# Patient Record
Sex: FEMALE | Race: OTHER | NOT HISPANIC OR LATINO | URBAN - METROPOLITAN AREA
[De-identification: names, ages, dates, MRNs, and addresses within clinical notes are randomized per-mention and may not be internally consistent; named-entity substitution may affect disease eponyms.]

---

## 2017-09-18 ENCOUNTER — EMERGENCY (EMERGENCY)
Facility: HOSPITAL | Age: 67
LOS: 1 days | Discharge: ANOTHER HOSPITAL | End: 2017-09-18
Attending: EMERGENCY MEDICINE | Admitting: EMERGENCY MEDICINE
Payer: MEDICARE

## 2017-09-18 VITALS
DIASTOLIC BLOOD PRESSURE: 81 MMHG | SYSTOLIC BLOOD PRESSURE: 162 MMHG | WEIGHT: 130.07 LBS | HEART RATE: 88 BPM | OXYGEN SATURATION: 98 % | TEMPERATURE: 99 F | RESPIRATION RATE: 11 BRPM

## 2017-09-18 DIAGNOSIS — Z79.4 LONG TERM (CURRENT) USE OF INSULIN: ICD-10-CM

## 2017-09-18 DIAGNOSIS — I24.9 ACUTE ISCHEMIC HEART DISEASE, UNSPECIFIED: ICD-10-CM

## 2017-09-18 DIAGNOSIS — E11.9 TYPE 2 DIABETES MELLITUS WITHOUT COMPLICATIONS: ICD-10-CM

## 2017-09-18 DIAGNOSIS — Z79.82 LONG TERM (CURRENT) USE OF ASPIRIN: ICD-10-CM

## 2017-09-18 DIAGNOSIS — I10 ESSENTIAL (PRIMARY) HYPERTENSION: ICD-10-CM

## 2017-09-18 LAB
ALBUMIN SERPL ELPH-MCNC: 3.5 G/DL — SIGNIFICANT CHANGE UP (ref 3.3–5)
ALP SERPL-CCNC: 71 U/L — SIGNIFICANT CHANGE UP (ref 40–120)
ALT FLD-CCNC: 16 U/L — SIGNIFICANT CHANGE UP (ref 12–78)
ANION GAP SERPL CALC-SCNC: 9 MMOL/L — SIGNIFICANT CHANGE UP (ref 5–17)
AST SERPL-CCNC: 18 U/L — SIGNIFICANT CHANGE UP (ref 15–37)
BASOPHILS # BLD AUTO: 0.1 K/UL — SIGNIFICANT CHANGE UP (ref 0–0.2)
BASOPHILS NFR BLD AUTO: 1.1 % — SIGNIFICANT CHANGE UP (ref 0–2)
BILIRUB SERPL-MCNC: 0.2 MG/DL — SIGNIFICANT CHANGE UP (ref 0.2–1.2)
BUN SERPL-MCNC: 17 MG/DL — SIGNIFICANT CHANGE UP (ref 7–23)
CALCIUM SERPL-MCNC: 8.6 MG/DL — SIGNIFICANT CHANGE UP (ref 8.5–10.1)
CHLORIDE SERPL-SCNC: 112 MMOL/L — HIGH (ref 96–108)
CK MB BLD-MCNC: 2.6 % — SIGNIFICANT CHANGE UP (ref 0–3.5)
CK MB CFR SERPL CALC: 1.9 NG/ML — SIGNIFICANT CHANGE UP (ref 0–3.6)
CK SERPL-CCNC: 73 U/L — SIGNIFICANT CHANGE UP (ref 26–192)
CO2 SERPL-SCNC: 20 MMOL/L — LOW (ref 22–31)
CREAT SERPL-MCNC: 1.3 MG/DL — SIGNIFICANT CHANGE UP (ref 0.5–1.3)
EOSINOPHIL # BLD AUTO: 0.2 K/UL — SIGNIFICANT CHANGE UP (ref 0–0.5)
EOSINOPHIL NFR BLD AUTO: 1.6 % — SIGNIFICANT CHANGE UP (ref 0–6)
GLUCOSE SERPL-MCNC: 117 MG/DL — HIGH (ref 70–99)
HCT VFR BLD CALC: 29.2 % — LOW (ref 34.5–45)
HGB BLD-MCNC: 9.6 G/DL — LOW (ref 11.5–15.5)
INR BLD: 1 RATIO — SIGNIFICANT CHANGE UP (ref 0.88–1.16)
LIDOCAIN IGE QN: 149 U/L — SIGNIFICANT CHANGE UP (ref 73–393)
LYMPHOCYTES # BLD AUTO: 2.6 K/UL — SIGNIFICANT CHANGE UP (ref 1–3.3)
LYMPHOCYTES # BLD AUTO: 23.8 % — SIGNIFICANT CHANGE UP (ref 13–44)
MCHC RBC-ENTMCNC: 27.2 PG — SIGNIFICANT CHANGE UP (ref 27–34)
MCHC RBC-ENTMCNC: 32.9 GM/DL — SIGNIFICANT CHANGE UP (ref 32–36)
MCV RBC AUTO: 82.6 FL — SIGNIFICANT CHANGE UP (ref 80–100)
MONOCYTES # BLD AUTO: 0.9 K/UL — SIGNIFICANT CHANGE UP (ref 0–0.9)
MONOCYTES NFR BLD AUTO: 7.8 % — SIGNIFICANT CHANGE UP (ref 1–9)
NEUTROPHILS # BLD AUTO: 7.2 K/UL — SIGNIFICANT CHANGE UP (ref 1.8–7.4)
NEUTROPHILS NFR BLD AUTO: 65.7 % — SIGNIFICANT CHANGE UP (ref 43–77)
PLATELET # BLD AUTO: 330 K/UL — SIGNIFICANT CHANGE UP (ref 150–400)
POTASSIUM SERPL-MCNC: 5.5 MMOL/L — HIGH (ref 3.5–5.3)
POTASSIUM SERPL-SCNC: 5.5 MMOL/L — HIGH (ref 3.5–5.3)
PROT SERPL-MCNC: 7.4 G/DL — SIGNIFICANT CHANGE UP (ref 6–8.3)
PROTHROM AB SERPL-ACNC: 10.9 SEC — SIGNIFICANT CHANGE UP (ref 9.8–12.7)
RBC # BLD: 3.54 M/UL — LOW (ref 3.8–5.2)
RBC # FLD: 14.7 % — HIGH (ref 10.3–14.5)
SODIUM SERPL-SCNC: 141 MMOL/L — SIGNIFICANT CHANGE UP (ref 135–145)
TROPONIN I SERPL-MCNC: 0.17 NG/ML — HIGH (ref 0.01–0.04)
WBC # BLD: 10.9 K/UL — HIGH (ref 3.8–10.5)
WBC # FLD AUTO: 10.9 K/UL — HIGH (ref 3.8–10.5)

## 2017-09-18 PROCEDURE — 71010: CPT | Mod: 26

## 2017-09-18 PROCEDURE — 99285 EMERGENCY DEPT VISIT HI MDM: CPT

## 2017-09-18 RX ORDER — ASPIRIN/CALCIUM CARB/MAGNESIUM 324 MG
81 TABLET ORAL DAILY
Qty: 0 | Refills: 0 | Status: DISCONTINUED | OUTPATIENT
Start: 2017-09-18 | End: 2017-09-19

## 2017-09-18 RX ORDER — SODIUM CHLORIDE 9 MG/ML
3 INJECTION INTRAMUSCULAR; INTRAVENOUS; SUBCUTANEOUS ONCE
Qty: 0 | Refills: 0 | Status: COMPLETED | OUTPATIENT
Start: 2017-09-18 | End: 2017-09-18

## 2017-09-18 RX ORDER — CLOPIDOGREL BISULFATE 75 MG/1
300 TABLET, FILM COATED ORAL ONCE
Qty: 0 | Refills: 0 | Status: COMPLETED | OUTPATIENT
Start: 2017-09-18 | End: 2017-09-18

## 2017-09-18 RX ORDER — ENOXAPARIN SODIUM 100 MG/ML
60 INJECTION SUBCUTANEOUS ONCE
Qty: 0 | Refills: 0 | Status: COMPLETED | OUTPATIENT
Start: 2017-09-18 | End: 2017-09-18

## 2017-09-18 RX ADMIN — Medication 81 MILLIGRAM(S): at 21:21

## 2017-09-18 RX ADMIN — SODIUM CHLORIDE 3 MILLILITER(S): 9 INJECTION INTRAMUSCULAR; INTRAVENOUS; SUBCUTANEOUS at 20:03

## 2017-09-18 RX ADMIN — CLOPIDOGREL BISULFATE 300 MILLIGRAM(S): 75 TABLET, FILM COATED ORAL at 21:44

## 2017-09-18 RX ADMIN — ENOXAPARIN SODIUM 60 MILLIGRAM(S): 100 INJECTION SUBCUTANEOUS at 21:43

## 2017-09-18 NOTE — ED PROVIDER NOTE - PROGRESS NOTE DETAILS
dw dr mccallum.  lovenox, plavix, hold pt in er for consult in am an likely cath pt seen by dr baer,  will be transferred to cath lab, accepting physician is dr patricia

## 2017-09-18 NOTE — ED PROVIDER NOTE - OBJECTIVE STATEMENT
Pt is a 66 yo femlae with diabetes. pt since 9/6 with intermittent chest pain radiating to throat on exertion and and relieved with rest.  no n/v, diaphoresis,  pt states currently no chest pain.  no leg swelling

## 2017-09-18 NOTE — ED ADULT NURSE NOTE - OBJECTIVE STATEMENT
pt presents to the ER awake alert and oriented x 4, NEWBY, airway patent C/O chest pain. Pt states she felt like something was sitting on top of her chest, denies SOB nausea or vomiting. VSS, EKG done, placed on the monitor, normal sinus. labs sent, awaiting results. will continue to monitor pt presents to the ER awake alert and oriented x 4, NEWBY, airway patent C/O chest pain. Pt states she felt like something was sitting on top of her chest, denies SOB nausea or vomiting. VSS, EKG done, placed on the monitor, normal sinus. labs sent, awaiting results. will continue to monitor  0700 patient received in bed A and O x3. No acute distress. Continuous cardiac monitoring in progress . MSpencer   0815 Gave report to Barbara in [place] No acute distress noted. .; MS  [id number] Patient transferred to [place] Cath Lab report given to EMS. No acute distress noted. Patient stable. MSpencer

## 2017-09-18 NOTE — ED PROVIDER NOTE - MEDICAL DECISION MAKING DETAILS
cp on exertion in diabetic patient since 9/6 cp on exertion in diabetic patient since 9/6--+ trop, lovenox, plavix, asa, cath in am likely

## 2017-09-18 NOTE — ED PROVIDER NOTE - CARE PLAN
Principal Discharge DX:	Chest pain on exertion Principal Discharge DX:	Chest pain on exertion  Secondary Diagnosis:	ACS (acute coronary syndrome)

## 2017-09-19 ENCOUNTER — OUTPATIENT (OUTPATIENT)
Dept: INPATIENT UNIT | Facility: HOSPITAL | Age: 67
LOS: 1 days | End: 2017-09-19
Payer: MEDICARE

## 2017-09-19 VITALS
TEMPERATURE: 98 F | HEART RATE: 84 BPM | RESPIRATION RATE: 16 BRPM | OXYGEN SATURATION: 97 % | WEIGHT: 130.07 LBS | DIASTOLIC BLOOD PRESSURE: 69 MMHG | SYSTOLIC BLOOD PRESSURE: 185 MMHG | HEIGHT: 59 IN

## 2017-09-19 VITALS
DIASTOLIC BLOOD PRESSURE: 77 MMHG | HEART RATE: 82 BPM | SYSTOLIC BLOOD PRESSURE: 150 MMHG | RESPIRATION RATE: 14 BRPM | OXYGEN SATURATION: 96 %

## 2017-09-19 DIAGNOSIS — Z98.890 OTHER SPECIFIED POSTPROCEDURAL STATES: Chronic | ICD-10-CM

## 2017-09-19 DIAGNOSIS — R07.89 OTHER CHEST PAIN: ICD-10-CM

## 2017-09-19 DIAGNOSIS — Z98.49 CATARACT EXTRACTION STATUS, UNSPECIFIED EYE: Chronic | ICD-10-CM

## 2017-09-19 DIAGNOSIS — Z98.891 HISTORY OF UTERINE SCAR FROM PREVIOUS SURGERY: Chronic | ICD-10-CM

## 2017-09-19 LAB
ANION GAP SERPL CALC-SCNC: 13 MMOL/L — SIGNIFICANT CHANGE UP (ref 5–17)
ANION GAP SERPL CALC-SCNC: 15 MMOL/L — SIGNIFICANT CHANGE UP (ref 5–17)
BUN SERPL-MCNC: 14 MG/DL — SIGNIFICANT CHANGE UP (ref 7–23)
BUN SERPL-MCNC: 18 MG/DL — SIGNIFICANT CHANGE UP (ref 7–23)
CALCIUM SERPL-MCNC: 10 MG/DL — SIGNIFICANT CHANGE UP (ref 8.4–10.5)
CALCIUM SERPL-MCNC: 9.2 MG/DL — SIGNIFICANT CHANGE UP (ref 8.4–10.5)
CHLORIDE SERPL-SCNC: 107 MMOL/L — SIGNIFICANT CHANGE UP (ref 96–108)
CHLORIDE SERPL-SCNC: 107 MMOL/L — SIGNIFICANT CHANGE UP (ref 96–108)
CHOLEST SERPL-MCNC: 233 MG/DL — HIGH (ref 10–199)
CK MB BLD-MCNC: 12.1 % — HIGH (ref 0–3.5)
CK MB CFR SERPL CALC: 16.2 NG/ML — HIGH (ref 0–3.8)
CK SERPL-CCNC: 134 U/L — SIGNIFICANT CHANGE UP (ref 25–170)
CO2 SERPL-SCNC: 20 MMOL/L — LOW (ref 22–31)
CO2 SERPL-SCNC: 22 MMOL/L — SIGNIFICANT CHANGE UP (ref 22–31)
CREAT SERPL-MCNC: 1.29 MG/DL — SIGNIFICANT CHANGE UP (ref 0.5–1.3)
CREAT SERPL-MCNC: 1.33 MG/DL — HIGH (ref 0.5–1.3)
GLUCOSE SERPL-MCNC: 114 MG/DL — HIGH (ref 70–99)
GLUCOSE SERPL-MCNC: 148 MG/DL — HIGH (ref 70–99)
HBA1C BLD-MCNC: 6.4 % — HIGH (ref 4–5.6)
HCT VFR BLD CALC: 30 % — LOW (ref 34.5–45)
HDLC SERPL-MCNC: 37 MG/DL — LOW (ref 40–125)
HGB BLD-MCNC: 10 G/DL — LOW (ref 11.5–15.5)
LIPID PNL WITH DIRECT LDL SERPL: 143 MG/DL — HIGH
MCHC RBC-ENTMCNC: 27.7 PG — SIGNIFICANT CHANGE UP (ref 27–34)
MCHC RBC-ENTMCNC: 33.2 GM/DL — SIGNIFICANT CHANGE UP (ref 32–36)
MCV RBC AUTO: 83.4 FL — SIGNIFICANT CHANGE UP (ref 80–100)
PLATELET # BLD AUTO: 313 K/UL — SIGNIFICANT CHANGE UP (ref 150–400)
POTASSIUM SERPL-MCNC: 4.6 MMOL/L — SIGNIFICANT CHANGE UP (ref 3.5–5.3)
POTASSIUM SERPL-MCNC: 5.8 MMOL/L — HIGH (ref 3.5–5.3)
POTASSIUM SERPL-SCNC: 4.6 MMOL/L — SIGNIFICANT CHANGE UP (ref 3.5–5.3)
POTASSIUM SERPL-SCNC: 5.8 MMOL/L — HIGH (ref 3.5–5.3)
RBC # BLD: 3.59 M/UL — LOW (ref 3.8–5.2)
RBC # FLD: 13.9 % — SIGNIFICANT CHANGE UP (ref 10.3–14.5)
SODIUM SERPL-SCNC: 142 MMOL/L — SIGNIFICANT CHANGE UP (ref 135–145)
SODIUM SERPL-SCNC: 142 MMOL/L — SIGNIFICANT CHANGE UP (ref 135–145)
TOTAL CHOLESTEROL/HDL RATIO MEASUREMENT: 6.3 RATIO — SIGNIFICANT CHANGE UP (ref 3.3–7.1)
TRIGL SERPL-MCNC: 263 MG/DL — HIGH (ref 10–149)
TROPONIN I SERPL-MCNC: 1.06 NG/ML — HIGH (ref 0.01–0.04)
TROPONIN T SERPL-MCNC: 0.47 NG/ML — HIGH (ref 0–0.06)
WBC # BLD: 9.3 K/UL — SIGNIFICANT CHANGE UP (ref 3.8–10.5)
WBC # FLD AUTO: 9.3 K/UL — SIGNIFICANT CHANGE UP (ref 3.8–10.5)

## 2017-09-19 PROCEDURE — 80053 COMPREHEN METABOLIC PANEL: CPT

## 2017-09-19 PROCEDURE — 93010 ELECTROCARDIOGRAM REPORT: CPT

## 2017-09-19 PROCEDURE — 36415 COLL VENOUS BLD VENIPUNCTURE: CPT

## 2017-09-19 PROCEDURE — 85610 PROTHROMBIN TIME: CPT

## 2017-09-19 PROCEDURE — 36000 PLACE NEEDLE IN VEIN: CPT

## 2017-09-19 PROCEDURE — 96372 THER/PROPH/DIAG INJ SC/IM: CPT

## 2017-09-19 PROCEDURE — 99285 EMERGENCY DEPT VISIT HI MDM: CPT | Mod: 25

## 2017-09-19 PROCEDURE — 82550 ASSAY OF CK (CPK): CPT

## 2017-09-19 PROCEDURE — 82553 CREATINE MB FRACTION: CPT

## 2017-09-19 PROCEDURE — 93005 ELECTROCARDIOGRAM TRACING: CPT

## 2017-09-19 PROCEDURE — 85027 COMPLETE CBC AUTOMATED: CPT

## 2017-09-19 PROCEDURE — 83690 ASSAY OF LIPASE: CPT

## 2017-09-19 PROCEDURE — 36416 COLLJ CAPILLARY BLOOD SPEC: CPT

## 2017-09-19 PROCEDURE — 84484 ASSAY OF TROPONIN QUANT: CPT

## 2017-09-19 PROCEDURE — 71045 X-RAY EXAM CHEST 1 VIEW: CPT

## 2017-09-19 RX ORDER — INSULIN LISPRO 100/ML
3 VIAL (ML) SUBCUTANEOUS
Qty: 0 | Refills: 0 | Status: DISCONTINUED | OUTPATIENT
Start: 2017-09-19 | End: 2017-09-20

## 2017-09-19 RX ORDER — ASPIRIN/CALCIUM CARB/MAGNESIUM 324 MG
81 TABLET ORAL DAILY
Qty: 0 | Refills: 0 | Status: DISCONTINUED | OUTPATIENT
Start: 2017-09-20 | End: 2017-09-20

## 2017-09-19 RX ORDER — DEXTROSE 50 % IN WATER 50 %
12.5 SYRINGE (ML) INTRAVENOUS ONCE
Qty: 0 | Refills: 0 | Status: DISCONTINUED | OUTPATIENT
Start: 2017-09-19 | End: 2017-09-20

## 2017-09-19 RX ORDER — ATORVASTATIN CALCIUM 80 MG/1
80 TABLET, FILM COATED ORAL AT BEDTIME
Qty: 0 | Refills: 0 | Status: DISCONTINUED | OUTPATIENT
Start: 2017-09-19 | End: 2017-09-20

## 2017-09-19 RX ORDER — AMLODIPINE BESYLATE 2.5 MG/1
5 TABLET ORAL DAILY
Qty: 0 | Refills: 0 | Status: DISCONTINUED | OUTPATIENT
Start: 2017-09-19 | End: 2017-09-19

## 2017-09-19 RX ORDER — AMLODIPINE BESYLATE 2.5 MG/1
10 TABLET ORAL DAILY
Qty: 0 | Refills: 0 | Status: DISCONTINUED | OUTPATIENT
Start: 2017-09-19 | End: 2017-09-20

## 2017-09-19 RX ORDER — SODIUM CHLORIDE 9 MG/ML
1000 INJECTION INTRAMUSCULAR; INTRAVENOUS; SUBCUTANEOUS
Qty: 0 | Refills: 0 | Status: DISCONTINUED | OUTPATIENT
Start: 2017-09-20 | End: 2017-09-20

## 2017-09-19 RX ORDER — DEXTROSE 50 % IN WATER 50 %
1 SYRINGE (ML) INTRAVENOUS ONCE
Qty: 0 | Refills: 0 | Status: DISCONTINUED | OUTPATIENT
Start: 2017-09-19 | End: 2017-09-20

## 2017-09-19 RX ORDER — DEXTROSE 50 % IN WATER 50 %
25 SYRINGE (ML) INTRAVENOUS ONCE
Qty: 0 | Refills: 0 | Status: DISCONTINUED | OUTPATIENT
Start: 2017-09-19 | End: 2017-09-20

## 2017-09-19 RX ORDER — INSULIN LISPRO 100/ML
VIAL (ML) SUBCUTANEOUS
Qty: 0 | Refills: 0 | Status: DISCONTINUED | OUTPATIENT
Start: 2017-09-19 | End: 2017-09-20

## 2017-09-19 RX ORDER — CLOPIDOGREL BISULFATE 75 MG/1
75 TABLET, FILM COATED ORAL DAILY
Qty: 0 | Refills: 0 | Status: DISCONTINUED | OUTPATIENT
Start: 2017-09-20 | End: 2017-09-20

## 2017-09-19 RX ORDER — GLUCAGON INJECTION, SOLUTION 0.5 MG/.1ML
1 INJECTION, SOLUTION SUBCUTANEOUS ONCE
Qty: 0 | Refills: 0 | Status: DISCONTINUED | OUTPATIENT
Start: 2017-09-19 | End: 2017-09-20

## 2017-09-19 RX ORDER — SODIUM CHLORIDE 9 MG/ML
1000 INJECTION, SOLUTION INTRAVENOUS
Qty: 0 | Refills: 0 | Status: DISCONTINUED | OUTPATIENT
Start: 2017-09-19 | End: 2017-09-20

## 2017-09-19 RX ORDER — INSULIN GLARGINE 100 [IU]/ML
12 INJECTION, SOLUTION SUBCUTANEOUS AT BEDTIME
Qty: 0 | Refills: 0 | Status: DISCONTINUED | OUTPATIENT
Start: 2017-09-19 | End: 2017-09-20

## 2017-09-19 RX ORDER — METOPROLOL TARTRATE 50 MG
25 TABLET ORAL
Qty: 0 | Refills: 0 | Status: DISCONTINUED | OUTPATIENT
Start: 2017-09-19 | End: 2017-09-20

## 2017-09-19 RX ORDER — SODIUM POLYSTYRENE SULFONATE 4.1 MEQ/G
30 POWDER, FOR SUSPENSION ORAL ONCE
Qty: 0 | Refills: 0 | Status: COMPLETED | OUTPATIENT
Start: 2017-09-19 | End: 2017-09-19

## 2017-09-19 RX ORDER — INSULIN LISPRO 100/ML
VIAL (ML) SUBCUTANEOUS AT BEDTIME
Qty: 0 | Refills: 0 | Status: DISCONTINUED | OUTPATIENT
Start: 2017-09-19 | End: 2017-09-20

## 2017-09-19 RX ADMIN — SODIUM POLYSTYRENE SULFONATE 30 GRAM(S): 4.1 POWDER, FOR SUSPENSION ORAL at 18:15

## 2017-09-19 RX ADMIN — INSULIN GLARGINE 12 UNIT(S): 100 INJECTION, SOLUTION SUBCUTANEOUS at 21:43

## 2017-09-19 RX ADMIN — ATORVASTATIN CALCIUM 80 MILLIGRAM(S): 80 TABLET, FILM COATED ORAL at 21:43

## 2017-09-19 RX ADMIN — Medication 25 MILLIGRAM(S): at 18:21

## 2017-09-19 RX ADMIN — Medication 3 UNIT(S): at 18:14

## 2017-09-19 RX ADMIN — Medication 1: at 18:15

## 2017-09-19 NOTE — CHART NOTE - NSCHARTNOTEFT_GEN_A_CORE
Patient is a 67y old  Female who presents with a chief complaint of chest s/p cardiac cath JACQUELINE x 2 OM1 via right radial. Called to the room by nurse for brief episode of lightheadedness possible due to vagal response to defecate after administration of Kayexalate for hyperkalemia 5.8.  Blood glucose 178mg/dl. Right radial assess site stable - no hematoma. Pt is now doing well with no further episodes. VSS. Will continue to monitor. Patient is a 67y old  Female who presents with a chief complaint of chest s/p cardiac cath JACQUELINE x 2 OM1 via right radial. Called to the room by nurse for brief episode of lightheadedness possible  vagal response from urge to defecate after administration of Kayexalate for hyperkalemia 5.8.  Blood glucose checked 178mg/dl. Right radial assess site stable - no hematoma. Pt is now doing well with no further episodes. VSS. Will continue to monitor.

## 2017-09-19 NOTE — H&P CARDIOLOGY - PMH
Diabetes mellitus    Hypertension CKD (chronic kidney disease) stage 3, GFR 30-59 ml/min    Diabetes mellitus    Hypertension

## 2017-09-19 NOTE — H&P CARDIOLOGY - FAMILY HISTORY
No pertinent family history in first degree relatives Father  Still living? Unknown  Family history of CABG, Age at diagnosis: Age Unknown     Mother  Still living? No  Family history of CABG, Age at diagnosis: Age Unknown

## 2017-09-19 NOTE — CHART NOTE - NSCHARTNOTEFT_GEN_A_CORE
CC: Hyperkalemia and CKD stage 3 s/p PCI for staged PCI 9/20 discussed plan with Dr. Moe, Nephrologist     HPI:  68 y/o with FH of CAD (Mom and DAD with CABG in their 70's) and pmh of HTN, DM2 (controlled, AIC 6.8), CKD stage 3,  presented to [place] ER on 9/18 at 6pm reporting chest pressure for about 15-20 minutes radiating from left chest to right at rest.  Pt also reports having dyspnea on exertion intermittently while climbing 1 flight of stairs for the past few months and throat discomfort over the past week that she treated with Nexium thinking it was heartburn.  Pt with Trop I 0.169->1.060; K5.5 ->5.8 Cr 1.30->1.29 EGFR 42.  Pt loaded with Plavix 300mg and ASA 81mg with Lovenox seen by Dr. Jennings, Cardiologist and transferred for cardiac cath for further evaluation 2/2 NSTEMI.  Pt denies symptoms presently.  Dr. Pena aware of K 5.8 will assess EDP to assess need for diuretic therapy to decrease potassium.  Will hold ARB for now pt with no arrhythmias on tele.    Renal - Dr. Bruno  Endocine - Dr. Susan Greco  PCP - Dr. Gary (19 Sep 2017 11:09)                          10.0   9.3   )-----------( 313      ( 19 Sep 2017 11:30 )             30.0   09-19    142  |  107  |  14  ----------------------------<  114<H>  5.8<H>   |  22  |  1.29    Ca    10.0      19 Sep 2017 11:30    TPro  7.4  /  Alb  3.5  /  TBili  0.2  /  DBili  x   /  AST  18  /  ALT  16  /  AlkPhos  71  09-18    Hemoglobin A1C, Whole Blood (09.19.17 @ 15:47)      Hemoglobin A1C, Whole Blood: 6.4: Method: Immunoassay         Reference Range                4.0-5.6%       High risk (prediabetic)        5.7-6.4%       Diabetic, diagnostic             >=6.5%       ADA diabetic treatment goal       <7.0%  The Hemoglobin A1c testing is NGSP-certified.6.4: Reference ranges are based  upon the 2010 recommendations of  the American Diabetes Association.  Interpretation may vary for children  and adolescents. %      Lipid Profile (09.19.17 @ 14:06)    HDL/Total Cholesterol Ratio Measurement: 6.3 RATIO    Cholesterol, Serum: 233 mg/dL    HDL Cholesterol, Serum: 37 mg/dL      Vital Signs T(C): 36.6 (09-19-17 @ 11:09), Max: 37.1 (09-18-17 @ 19:10)  HR: 83 (09-19-17 @ 17:00) (73 - 88)  BP: 156/69 (09-19-17 @ 17:00) (150/77 - 185/69)  RR: 17 (09-19-17 @ 17:00) (11 - 17)  SpO2: 99% (09-19-17 @ 17:00) (96% - 99%)  Wt(kg): --     Medications insulin glargine Injectable (LANTUS) 12 Unit(s) SubCutaneous at bedtime  insulin lispro Injectable (HumaLOG) 3 Unit(s) SubCutaneous three times a day before meals  insulin lispro (HumaLOG) corrective regimen sliding scale   SubCutaneous three times a day before meals  insulin lispro (HumaLOG) corrective regimen sliding scale   SubCutaneous at bedtime  dextrose 5%. 1000 milliLiter(s) IV Continuous <Continuous>  dextrose Gel 1 Dose(s) Oral once PRN  dextrose 50% Injectable 12.5 Gram(s) IV Push once  dextrose 50% Injectable 25 Gram(s) IV Push once  dextrose 50% Injectable 25 Gram(s) IV Push once  glucagon  Injectable 1 milliGRAM(s) IntraMuscular once PRN    < from: Cardiac Cath Lab - Adult (09.19.17 @ 14:14) >    CORONARY VESSELS: The coronary circulation is right dominant.  LM:   --  LM: Angiography showed minor luminal irregularities with no flow  limiting lesions.  LAD:   --  LAD: Angiography showed minor luminal irregularities with no  flow limiting lesions.  CX:   --  OM1: There was a 90 % stenosis.  RCA:   --  Mid RCA: There was a 80 % stenosis.  COMPLICATIONS: There were no complications.  DIAGNOSTIC RECOMMENDATIONS: ASA and Plavix for 1 year.    < end of copied text >      amLODIPine   Tablet 10 milliGRAM(s) Oral daily  sodium polystyrene sulfonate Suspension 30 Gram(s) Oral once  metoprolol 25 milliGRAM(s) Oral two times a day  atorvastatin 80 milliGRAM(s) Oral at bedtime        Plan:  68 y/o female with pmh of HTN, HLD, CKD3 with NSTEMI and hyperkalemia s/p PCI/JACQUELINE to OM1 90% x 2 for staged PCI to mRCA 80% 9/20/17  EDP 18    C/W Aspirin/Plavix         start Atorvastatin 80         start Metoprolol 25mg BID         Continue Lantus 12 (home dose 25) 2/2 cath         Humalog 3 units premeal         Increase Norvasc from 5mg to 10mg         Hold Valsartan         NS 0.9% @75 for 1 hour prior to cath and 4 hours post         Nephrology and Cardiology to see pt in am         Continue on tele and monitor as per protocol

## 2017-09-19 NOTE — CHART NOTE - NSCHARTNOTEFT_GEN_A_CORE
Called and spoke with Dr. Meo from [facility] (pts Nephrologist) to discuss treatment plan for pt's CKD and hyperkalemia.    HPI:  68 y/o with FH of CAD (Mom and DAD with CABG in their 70's) and pmh of HTN, DM2 (controlled, AIC 6.8), CKD stage 3,  presented to [place] ER on 9/18 at 6pm reporting chest pressure for about 15-20 minutes radiating from left chest to right at rest.  Pt also reports having dyspnea on exertion intermittently while climbing 1 flight of stairs for the past few months and throat discomfort over the past week that she treated with Nexium thinking it was heartburn.  Pt with Trop I 0.169->1.060; K5.5 ->5.8 Cr 1.30->1.29 EGFR 42.  Pt loaded with Plavix 300mg and ASA 81mg with Lovenox seen by Dr. Jennings, Cardiologist and transferred for cardiac cath for further evaluation 2/2 NSTEMI.  Pt denies symptoms presently.  Dr. Pena aware of K 5.8 will assess EDP to assess need for diuretic therapy to decrease potassium.  Will hold ARB for now pt with no arrhythmias on tele.    Renal - Dr. Moe  Endocine - Dr. Susan Greco  PCP - Dr. Gayr (19 Sep 2017 11:09)    Vital Signs T(C): 36.6 (09-19-17 @ 11:09), Max: 37.1 (09-18-17 @ 19:10)  HR: 83 (09-19-17 @ 17:00) (73 - 88)  BP: 156/69 (09-19-17 @ 17:00) (150/77 - 185/69)  RR: 17 (09-19-17 @ 17:00) (11 - 17)  SpO2: 99% (09-19-17 @ 17:00) (96% - 99%)  Wt(kg): --     Medications insulin glargine Injectable (LANTUS) 12 Unit(s) SubCutaneous at bedtime  insulin lispro Injectable (HumaLOG) 3 Unit(s) SubCutaneous three times a day before meals  insulin lispro (HumaLOG) corrective regimen sliding scale   SubCutaneous three times a day before meals  insulin lispro (HumaLOG) corrective regimen sliding scale   SubCutaneous at bedtime  dextrose 5%. 1000 milliLiter(s) IV Continuous <Continuous>  dextrose Gel 1 Dose(s) Oral once PRN  dextrose 50% Injectable 12.5 Gram(s) IV Push once  dextrose 50% Injectable 25 Gram(s) IV Push once  dextrose 50% Injectable 25 Gram(s) IV Push once  glucagon  Injectable 1 milliGRAM(s) IntraMuscular once PRN  amLODIPine   Tablet 10 milliGRAM(s) Oral daily  sodium polystyrene sulfonate Suspension 30 Gram(s) Oral once    < from: Cardiac Cath Lab - Adult (09.19.17 @ 14:14) >    CORONARY VESSELS: The coronary circulation is right dominant.  LM:   --  LM: Angiography showed minor luminal irregularities with no flow  limiting lesions.  LAD:   --  LAD: Angiography showed minor luminal irregularities with no  flow limiting lesions.  CX:   --  OM1: There was a 90 % stenosis.  RCA:   --  Mid RCA: There was a 80 % stenosis.  COMPLICATIONS: There were no complications.  DIAGNOSTIC RECOMMENDATIONS: ASA and Plavix for 1 year.  INTERVENTIONAL RECOMMENDATIONS: ASA and Plavix for 1 year.    < end of copied text >                        10.0   9.3   )-----------( 313      ( 19 Sep 2017 11:30 )             30.0     09-19    142  |  107  |  14  ----------------------------<  114<H>  5.8<H>   |  22  |  1.29    Ca    10.0      19 Sep 2017 11:30      TPro  7.4  /  Alb  3.5  /  TBili  0.2  /  DBili  x   /  AST  18  /  ALT  16  /  AlkPhos  71  09-18    Lipid Profile (09.19.17 @ 14:06)    HDL/Total Cholesterol Ratio Measurement: 6.3 RATIO    Cholesterol, Serum: 233 mg/dL    HDL Cholesterol, Serum: 37 mg/dL    Plan:  Kayexalate 30mg PO  repeat BMP in am or after BM  NS 0.9 % @75cc/hr 1 hour prior to cardiac cath for staged PCI on 9/20 and 4 hours post cath.  Hold Valsartan   Increase Amlodipine to 10mg   start Metoprolol 25mg bid 2/2 NSTEMI/CAD  start Lipitor 80mg  Continue on Tele

## 2017-09-19 NOTE — H&P CARDIOLOGY - ATTENDING COMMENTS
Patient seen and examined. Agree with above with following changes:   67 F w hx as listed p.w NSTEMI  - Now s/p PCI to OM  - Stage to RCA today  - Cont DAPT  - Cont Norvasc  - Increase lorpessor to 50mg q12  - Cont high dose statin  - Monitor and replete electrolytes. Keep K>4.0 and Mg>2.0.  - Further cardiac workup will depend on clinical course.   - If ok can d/c home later post PCI. Spent >30min

## 2017-09-19 NOTE — ED ADULT NURSE REASSESSMENT NOTE - NS ED NURSE REASSESS COMMENT FT1
pt awake alert and oriented x 4. NEWBY, airway patent. No SOB, denies chest pain, but C/O slight chest pressure. Vss, afebrile. will continue to monitor.

## 2017-09-19 NOTE — H&P CARDIOLOGY - PSH
No significant past surgical history History of colonoscopy    S/P carpal tunnel release    S/P  section  x 2  Status post cataract extraction and insertion of intraocular lens, unspecified laterality

## 2017-09-19 NOTE — CONSULT NOTE ADULT - SUBJECTIVE AND OBJECTIVE BOX
[facility] Consultation    CHIEF COMPLAINT: Patient is a 67y old  Female who presents with a chief complaint of chest pain    HPI: Andree developed chest pressure last night which she described as a severe central chest pressure radiating to both shoulders, different than symptoms of reflux she had been experiencing over the last week. The symptoms occurred at rest and were not associated with nausea, vomiting, diaphoresis, or palpitations. She continues to have pain, now 3/10 in severity after medical therapy.      PAST MEDICAL & SURGICAL HISTORY:  Hypertension  Diabetes mellitus  No significant past surgical history      SOCIAL HISTORY: no tob/etoh    FAMILY HISTORY: no CAD  No pertinent family history in first degree relatives      MEDICATIONS  (STANDING):  aspirin  chewable 81 milliGRAM(s) Oral fernando      Allergies    No Known Allergies      REVIEW OF SYSTEMS:    CONSTITUTIONAL: No weakness, fevers or chills  EYES: No visual changes, No diplopia  ENMT: pos throat pain , No exudate  NECK: No pain or stiffness  RESPIRATORY: No cough, wheezing, hemoptysis; No shortness of breath  CARDIOVASCULAR:  chest pain as above, no palpitations  GASTROINTESTINAL: No abdominal pain. No nausea, vomiting, or hematemesis; No diarrhea or constipation. No melena or hematochezia.  GENITOURINARY: No dysuria, frequency or hematuria  NEUROLOGICAL: No numbness or weakness  SKIN: No itching or rash  All other review of systems is negative unless indicated above    VITAL SIGNS:   Vital Signs Last 24 Hrs  T(C): 36.7 (19 Sep 2017 06:58), Max: 37.1 (18 Sep 2017 19:10)  T(F): 98.1 (19 Sep 2017 06:58), Max: 98.7 (18 Sep 2017 19:10)  HR: 82 (19 Sep 2017 06:58) (80 - 88)  BP: 157/74 (19 Sep 2017 06:58) (155/74 - 162/81)  BP(mean): --  RR: 12 (19 Sep 2017 06:58) (11 - 15)  SpO2: 97% (19 Sep 2017 06:58) (97% - 98%)        PHYSICAL EXAM    Constitutional: NAD, awake and alert, well-developed  Eyes:  EOMI,  Pupils round, no lesions  ENMT: no exudate or erythema  Pulmonary: Non-labored, breath sounds are clear bilaterally, No wheezing, rales or rhonchi  Cardiovascular: PMI not palpable  Regular S1 and S2, no murmurs, rubs, gallops or clicks  Gastrointestinal: Bowel Sounds present, soft, nontender.   Lymph: No peripheral edema. No cervical lymphadenopathy.  Neurological: Alert, no focal deficits  Skin: No rashes. Changes of chronic venous stasis. No cyanosis.  Psych:  Mood & affect appropriate    LABS: All Labs Reviewed:                        9.6    10.9  )-----------( 330      ( 18 Sep 2017 20:19 )             29.2     18 Sep 2017 20:19    141    |  112    |  17     ----------------------------<  117    5.5     |  20     |  1.30     Ca    8.6        18 Sep 2017 20:19    TPro  7.4    /  Alb  3.5    /  TBili  0.2    /  DBili  x      /  AST  18     /  ALT  16     /  AlkPhos  71     18 Sep 2017 20:19    PT/INR - ( 18 Sep 2017 20:19 )   PT: 10.9 sec;   INR: 1.00 ratio           CARDIAC MARKERS ( 19 Sep 2017 02:21 )  1.060 ng/mL / x     / x     / x     / x      CARDIAC MARKERS ( 18 Sep 2017 20:19 )  .169 ng/mL / x     / 73 U/L / x     / 1.9 ng/mL              RADIOLOGY/EKG:  Electrocardiography reveals sinus rhythm with normal electrocardiographic intervals and no significant abnormalities    < from: Xray Chest 1 View AP/PA (09.18.17 @ 20:20) >    EXAM:  CHEST 1 VIEW                            PROCEDURE DATE:  09/18/2017          INTERPRETATION:  History: Chest pain    Portable semierect view of the chest is performed. There are no prior   studies for comparison.  The cardiomediastinal silhouette is normal. there is no focal infiltrate   or pleural effusion. There is no pneumothorax. The osseous structures are   intact.    Impression: No active pulmonary disease.                PAUL CYR   This document has been electronically signed. Sep 18 2017  8:25PM                < end of copied text >

## 2017-09-19 NOTE — H&P CARDIOLOGY - HISTORY OF PRESENT ILLNESS
66 y/o with FH of CAD (Mom and DAD with CABG in their 70's) and pmh of HTN, DM2 (controlled, AIC 6.8), CKD stage 3,  presented to [place] ER on 9/18 at 6pm reporting chest pressure for about 15-20 minutes radiating from left chest to right at rest.  Pt also reports having dyspnea on exertion intermittently while climbing 1 flight of stairs for the past few months and throat discomfort over the past week that she treated with Nexium thinking it was heartburn.  Pt with Trop I 0.169->1.060; K5.5 ->5.8 Cr 1.30->1.29 EGFR 42.  Pt loaded with Plavix 300mg and ASA 81mg with Lovenox seen by Dr. Jennings, Cardiologist and transferred for cardiac cath for further evaluation 2/2 NSTEMI.  Pt denies symptoms presently.  Dr. Pena aware of K 5.8 will assess EDP to assess need for diuretic therapy to decrease potassium.  Will hold ARB for now pt with no arrhythmias on tele.    Renal - Dr. Bruno  Endocine - Dr. Susan Greco  PCP - Dr. Gary

## 2017-09-19 NOTE — CONSULT NOTE ADULT - ASSESSMENT
Andree's clinical presentation is consistent with an acute non-ST segment elevation myocardial infarction, still having chest discomfort. She has normal electrocardiography examination at present and remains hemodynamically stable. She is being treated with dual antiplatelet therapy including aspirin and clopidogrel. She has also been given enoxaparin. She'll be transferred for urgent cardiac catheterization to better define her coronary anatomy and need for further intervention. Arrangements have been made with [facility] and I explained her situation in depth.    Time spent 35 minutes of critical care medicine

## 2017-09-20 ENCOUNTER — TRANSCRIPTION ENCOUNTER (OUTPATIENT)
Age: 67
End: 2017-09-20

## 2017-09-20 VITALS
RESPIRATION RATE: 18 BRPM | DIASTOLIC BLOOD PRESSURE: 60 MMHG | SYSTOLIC BLOOD PRESSURE: 136 MMHG | TEMPERATURE: 98 F | HEART RATE: 86 BPM | OXYGEN SATURATION: 98 %

## 2017-09-20 LAB
ANION GAP SERPL CALC-SCNC: 16 MMOL/L — SIGNIFICANT CHANGE UP (ref 5–17)
BUN SERPL-MCNC: 19 MG/DL — SIGNIFICANT CHANGE UP (ref 7–23)
CALCIUM SERPL-MCNC: 9.1 MG/DL — SIGNIFICANT CHANGE UP (ref 8.4–10.5)
CHLORIDE SERPL-SCNC: 106 MMOL/L — SIGNIFICANT CHANGE UP (ref 96–108)
CO2 SERPL-SCNC: 20 MMOL/L — LOW (ref 22–31)
CREAT SERPL-MCNC: 1.36 MG/DL — HIGH (ref 0.5–1.3)
GLUCOSE SERPL-MCNC: 115 MG/DL — HIGH (ref 70–99)
HCT VFR BLD CALC: 27.9 % — LOW (ref 34.5–45)
HGB BLD-MCNC: 9.7 G/DL — LOW (ref 11.5–15.5)
MCHC RBC-ENTMCNC: 28.7 PG — SIGNIFICANT CHANGE UP (ref 27–34)
MCHC RBC-ENTMCNC: 34.7 GM/DL — SIGNIFICANT CHANGE UP (ref 32–36)
MCV RBC AUTO: 82.9 FL — SIGNIFICANT CHANGE UP (ref 80–100)
PLATELET # BLD AUTO: 323 K/UL — SIGNIFICANT CHANGE UP (ref 150–400)
POTASSIUM SERPL-MCNC: 4.5 MMOL/L — SIGNIFICANT CHANGE UP (ref 3.5–5.3)
POTASSIUM SERPL-SCNC: 4.5 MMOL/L — SIGNIFICANT CHANGE UP (ref 3.5–5.3)
RBC # BLD: 3.37 M/UL — LOW (ref 3.8–5.2)
RBC # FLD: 13.9 % — SIGNIFICANT CHANGE UP (ref 10.3–14.5)
SODIUM SERPL-SCNC: 142 MMOL/L — SIGNIFICANT CHANGE UP (ref 135–145)
WBC # BLD: 10.3 K/UL — SIGNIFICANT CHANGE UP (ref 3.8–10.5)
WBC # FLD AUTO: 10.3 K/UL — SIGNIFICANT CHANGE UP (ref 3.8–10.5)

## 2017-09-20 PROCEDURE — 99153 MOD SED SAME PHYS/QHP EA: CPT

## 2017-09-20 PROCEDURE — 83036 HEMOGLOBIN GLYCOSYLATED A1C: CPT

## 2017-09-20 PROCEDURE — C9600: CPT | Mod: RC

## 2017-09-20 PROCEDURE — 93458 L HRT ARTERY/VENTRICLE ANGIO: CPT | Mod: 59

## 2017-09-20 PROCEDURE — 93010 ELECTROCARDIOGRAM REPORT: CPT

## 2017-09-20 PROCEDURE — C1887: CPT

## 2017-09-20 PROCEDURE — 82553 CREATINE MB FRACTION: CPT

## 2017-09-20 PROCEDURE — C1725: CPT

## 2017-09-20 PROCEDURE — 80061 LIPID PANEL: CPT

## 2017-09-20 PROCEDURE — C1874: CPT

## 2017-09-20 PROCEDURE — 82550 ASSAY OF CK (CPK): CPT

## 2017-09-20 PROCEDURE — 99152 MOD SED SAME PHYS/QHP 5/>YRS: CPT

## 2017-09-20 PROCEDURE — C1769: CPT

## 2017-09-20 PROCEDURE — 85027 COMPLETE CBC AUTOMATED: CPT

## 2017-09-20 PROCEDURE — C9606: CPT | Mod: LC

## 2017-09-20 PROCEDURE — 80048 BASIC METABOLIC PNL TOTAL CA: CPT

## 2017-09-20 PROCEDURE — 93005 ELECTROCARDIOGRAM TRACING: CPT

## 2017-09-20 PROCEDURE — 84484 ASSAY OF TROPONIN QUANT: CPT

## 2017-09-20 PROCEDURE — 93010 ELECTROCARDIOGRAM REPORT: CPT | Mod: 77

## 2017-09-20 PROCEDURE — C1894: CPT

## 2017-09-20 RX ORDER — ATORVASTATIN CALCIUM 80 MG/1
1 TABLET, FILM COATED ORAL
Qty: 30 | Refills: 0 | OUTPATIENT
Start: 2017-09-20 | End: 2017-10-20

## 2017-09-20 RX ORDER — AMLODIPINE BESYLATE 2.5 MG/1
1 TABLET ORAL
Qty: 30 | Refills: 0 | OUTPATIENT
Start: 2017-09-20 | End: 2017-10-20

## 2017-09-20 RX ORDER — METOPROLOL TARTRATE 50 MG
50 TABLET ORAL
Qty: 0 | Refills: 0 | Status: DISCONTINUED | OUTPATIENT
Start: 2017-09-20 | End: 2017-09-20

## 2017-09-20 RX ORDER — METOPROLOL TARTRATE 50 MG
1 TABLET ORAL
Qty: 60 | Refills: 0 | OUTPATIENT
Start: 2017-09-20 | End: 2017-10-20

## 2017-09-20 RX ORDER — ASPIRIN/CALCIUM CARB/MAGNESIUM 324 MG
1 TABLET ORAL
Qty: 0 | Refills: 0 | COMMUNITY
Start: 2017-09-20

## 2017-09-20 RX ORDER — CLOPIDOGREL BISULFATE 75 MG/1
1 TABLET, FILM COATED ORAL
Qty: 90 | Refills: 3 | OUTPATIENT
Start: 2017-09-20 | End: 2018-09-14

## 2017-09-20 RX ADMIN — Medication 81 MILLIGRAM(S): at 05:48

## 2017-09-20 RX ADMIN — Medication 50 MILLIGRAM(S): at 17:31

## 2017-09-20 RX ADMIN — Medication 3 UNIT(S): at 07:30

## 2017-09-20 RX ADMIN — CLOPIDOGREL BISULFATE 75 MILLIGRAM(S): 75 TABLET, FILM COATED ORAL at 05:47

## 2017-09-20 RX ADMIN — AMLODIPINE BESYLATE 10 MILLIGRAM(S): 2.5 TABLET ORAL at 12:22

## 2017-09-20 RX ADMIN — Medication 1: at 17:32

## 2017-09-20 RX ADMIN — Medication 25 MILLIGRAM(S): at 05:48

## 2017-09-20 RX ADMIN — Medication 3 UNIT(S): at 17:31

## 2017-09-20 NOTE — DISCHARGE NOTE ADULT - PLAN OF CARE
Your blood pressure will be controlled. Continue with your blood pressure medications; eat a heart healthy diet with low salt diet; exercise regularly (consult with your physician or cardiologist first); maintain a heart healthy weight; if you smoke - quit (A resource to help you stop smoking is the [facility] – phone number [phone].); include healthy ways to manage stress. Continue to follow with your primary care physician or cardiologist. Your LDL cholesterol will be less than 70mg/dL Continue with your cholesterol medications. Eat a heart healthy diet that is low in saturated fats and salt, and includes whole grains, fruits, vegetables and lean protein; exercise regularly (consult with your physician or cardiologist first); maintain a heart healthy weight; if you smoke - quit (A resource to help you stop smoking is the [facility] – phone number [phone].). Continue to follow with your primary physician or cardiologist. Low salt, low fat diet.   Weight management.   Take medications as prescribed.    No smoking.  Follow up appointments with your doctor(s)  as instructed. No heavy lifting for 2 weeks, no strenuous activity  ( pushing/ pulling) no driving for x 2 days,  you may shower 24 hours following procedure but no bathing or swimming for x1  week, no strenuous sex for x 1 week & follow up with your cardiologist in 1-2 week Your hemoglobin A1C will be at a normal range (normal range is from 6-8) Continue to follow with your primary care MD or your endocrinologist. Discuss what the goal hemoglobin A1C level is for you.  Follow a heart healthy diabetic diet. If you check your fingerstick glucose at home, call your MD if it is greater than 250mg/dL on 2 occasions or less than 100mg/dL on 2 occasions. Know signs of low blood sugar, such as: dizziness, shakiness, sweating, confusion, hunger, nervousness- drink 4 ounces apple juice if occurs and call your doctor. Know early signs of high blood sugar, such as: frequent urination, increased thirst, blurry vision, fatigue, headache - call your doctor if this occurs.

## 2017-09-20 NOTE — PROGRESS NOTE ADULT - SUBJECTIVE AND OBJECTIVE BOX
Patient is a 67y Female  being evaluated for ckd  awaits cath today  resting comfortably  voiding well  pt known to dr munguia                        PHYSICAL EXAM:  Vitals:  T(F): 97.6 (09-20-17 @ 05:31), Max: 97.9 (09-19-17 @ 19:30)  HR: 88 (09-20-17 @ 05:31)  BP: 142/63 (09-20-17 @ 05:31)  BP(mean): 107 (09-19-17 @ 11:09)  RR: 17 (09-20-17 @ 05:31)  SpO2: 97% (09-20-17 @ 05:31)  Wt(kg): --  I and O's:    09-19 @ 07:01  -  09-20 @ 07:00  --------------------------------------------------------  IN: 1270 mL / OUT: 1000 mL / NET: 270 mL    09-20 @ 07:01  -  09-20 @ 08:31  --------------------------------------------------------  IN: 240 mL / OUT: 0 mL / NET: 240 mL        Height (cm): 149.86 (09-19 @ 12:18)  Weight (kg): 59 (09-19 @ 12:18), 59 (09-18 @ 19:10)  BMI (kg/m2): 26.3 (09-19 @ 12:18)    REVIEW OF SYSTEMS:  CONSTITUTIONAL: No weakness, fevers or chills  RESPIRATORY: No cough, wheezing, hemoptysis; No shortness of breath  CARDIOVASCULAR: No chest pain or palpitations  GI : no abd pains, nausea or vomiting  GENITOURINARY: No dysuria, frequency or hematuria  All other review of systems is negative unless indicated above.    Allergies    No Known Allergies    Intolerances        MEDICATIONS  (STANDING):  aspirin enteric coated 81 milliGRAM(s) Oral daily  insulin glargine Injectable (LANTUS) 12 Unit(s) SubCutaneous at bedtime  insulin lispro Injectable (HumaLOG) 3 Unit(s) SubCutaneous three times a day before meals  insulin lispro (HumaLOG) corrective regimen sliding scale   SubCutaneous three times a day before meals  insulin lispro (HumaLOG) corrective regimen sliding scale   SubCutaneous at bedtime  dextrose 5%. 1000 milliLiter(s) (50 mL/Hr) IV Continuous <Continuous>  dextrose 50% Injectable 12.5 Gram(s) IV Push once  dextrose 50% Injectable 25 Gram(s) IV Push once  dextrose 50% Injectable 25 Gram(s) IV Push once  clopidogrel Tablet 75 milliGRAM(s) Oral daily  amLODIPine   Tablet 10 milliGRAM(s) Oral daily  sodium chloride 0.9%. 1000 milliLiter(s) (75 mL/Hr) IV Continuous <Continuous>  atorvastatin 80 milliGRAM(s) Oral at bedtime  metoprolol 50 milliGRAM(s) Oral two times a day      LABS:    09-19 @ 07:01  -  09-20 @ 07:00  --------------------------------------------------------  IN: 1270 mL / OUT: 1000 mL / NET: 270 mL    09-20 @ 07:01  -  09-20 @ 08:31  --------------------------------------------------------  IN: 240 mL / OUT: 0 mL / NET: 240 mL      Height (cm): 149.86 (09-19 @ 12:18)  Weight (kg): 59 (09-19 @ 12:18), 59 (09-18 @ 19:10)  BMI (kg/m2): 26.3 (09-19 @ 12:18)      09-19 @ 07:01  -  09-20 @ 07:00  --------------------------------------------------------  IN: 1270 mL / OUT: 1000 mL / NET: 270 mL    09-20 @ 07:01  -  09-20 @ 08:31  --------------------------------------------------------  IN: 240 mL / OUT: 0 mL / NET: 240 mL      Height (cm): 149.86 (09-19 @ 12:18)  Weight (kg): 59 (09-19 @ 12:18), 59 (09-18 @ 19:10)  BMI (kg/m2): 26.3 (09-19 @ 12:18)    09-20    142  |  106  |  19  ----------------------------<  115<H>  4.5   |  20<L>  |  1.36<H>    Ca    9.1      20 Sep 2017 03:51    TPro  7.4  /  Alb  3.5  /  TBili  0.2  /  DBili  x   /  AST  18  /  ALT  16  /  AlkPhos  71  09-18              CBC Full  -  ( 20 Sep 2017 03:52 )  WBC Count : 10.3 K/uL  Hemoglobin : 9.7 g/dL  Hematocrit : 27.9 %  Platelet Count - Automated : 323 K/uL  Mean Cell Volume : 82.9 fl  Mean Cell Hemoglobin : 28.7 pg  Mean Cell Hemoglobin Concentration : 34.7 gm/dL  Auto Neutrophil # : x  Auto Lymphocyte # : x  Auto Monocyte # : x  Auto Eosinophil # : x  Auto Basophil # : x  Auto Neutrophil % : x  Auto Lymphocyte % : x  Auto Monocyte % : x  Auto Eosinophil % : x  Auto Basophil % : x    09-20    142  |  106  |  19  ----------------------------<  115<H>  4.5   |  20<L>  |  1.36<H>    Ca    9.1      20 Sep 2017 03:51    TPro  7.4  /  Alb  3.5  /  TBili  0.2  /  DBili  x   /  AST  18  /  ALT  16  /  AlkPhos  71  09-18        Imp:  67y Female  renal function stable  voiding well  await cath per cv  no objection to dc planning from renal standpoint with outpt followup labs, pending cath results/rx  as per cv  dw pt
Patient is a 67y old  Female who presents with a chief complaint of chest pain now s/p cardiac cath JACQUELINE 2 OM1 99%; mRCA (80%) via right radial  - staged  for         Allergies    No Known Allergies    Intolerances        Medications:  aspirin enteric coated 81 milliGRAM(s) Oral daily  insulin glargine Injectable (LANTUS) 12 Unit(s) SubCutaneous at bedtime  insulin lispro Injectable (HumaLOG) 3 Unit(s) SubCutaneous three times a day before meals  insulin lispro (HumaLOG) corrective regimen sliding scale   SubCutaneous three times a day before meals  insulin lispro (HumaLOG) corrective regimen sliding scale   SubCutaneous at bedtime  dextrose 5%. 1000 milliLiter(s) IV Continuous <Continuous>  dextrose Gel 1 Dose(s) Oral once PRN  dextrose 50% Injectable 12.5 Gram(s) IV Push once  dextrose 50% Injectable 25 Gram(s) IV Push once  dextrose 50% Injectable 25 Gram(s) IV Push once  glucagon  Injectable 1 milliGRAM(s) IntraMuscular once PRN  clopidogrel Tablet 75 milliGRAM(s) Oral daily  amLODIPine   Tablet 10 milliGRAM(s) Oral daily  sodium chloride 0.9%. 1000 milliLiter(s) IV Continuous <Continuous>  metoprolol 25 milliGRAM(s) Oral two times a day  atorvastatin 80 milliGRAM(s) Oral at bedtime      Vitals:  T(C): 36.6 (17 @ 19:30), Max: 36.7 (17 @ 06:58)  HR: 88 (17 @ 21:30) (73 - 89)  BP: 156/76 (17 @ 21:30) (102/62 - 185/69)  BP(mean): 107 (17 @ 11:09) (107 - 107)  RR: 17 (17 @ 21:30) (12 - 17)  SpO2: 97% (17 @ 21:30) (96% - 100%)  Wt(kg): --  Daily Height in cm: 149.86 (19 Sep 2017 11:09)    Daily Weight in k (19 Sep 2017 11:09)  I&O's Summary    19 Sep 2017 07:01  -  20 Sep 2017 05:08  --------------------------------------------------------  IN: 1090 mL / OUT: 1000 mL / NET: 90 mL          Physical Exam:  Appearance: Normal  HENT: Normal oral muscosa, NC/AT  Cardiovascular: S1S2, RRR, No M/R/G, no JVD, No Lower extremity edema  Procedural Access Site: Right radial.  No hematoma, Non-tender to palpation, 2+ pulse, No bruit, No Ecchymosis  Respiratory: Clear to auscultation bilaterally  Gastrointestinal: Soft, Non tender, Normal Bowel Sounds  Musculoskeletal: No clubbing, No joint deformity   Neurologic: Non-focal  Psychiatry: AAOx3, Mood & affect appropriate  Skin: No rashes, No ecchymoses, No cyanosis        142  |  106  |  19  ----------------------------<  115<H>  4.5   |  20<L>  |  1.36<H>    Ca    9.1      20 Sep 2017 03:51    TPro  7.4  /  Alb  3.5  /  TBili  0.2  /  DBili  x   /  AST  18  /  ALT  16  /  AlkPhos  71      PT/INR - ( 18 Sep 2017 20:19 )   PT: 10.9 sec;   INR: 1.00 ratio           CARDIAC MARKERS ( 19 Sep 2017 11:30 )  x     / 0.47 ng/mL / 134 U/L / x     / 16.2 ng/mL  CARDIAC MARKERS ( 19 Sep 2017 02:21 )  1.060 ng/mL / x     / x     / x     / x      CARDIAC MARKERS ( 18 Sep 2017 20:19 )  .169 ng/mL / x     / 73 U/L / x     / 1.9 ng/mL        Lipid panel Total Cholesterol: 233  LDL: 143  HDL: 37  T      Hgb A1c Hemoglobin A1C, Whole Blood: 6.4 % ( @ 15:47)      ECG: NSR 80bpm    Cath: < from: Cardiac Cath Lab - Adult (17 @ 14:14) >    [facility]  Department of Cardiology  [address] [place] [address]  [phone]  Cath Lab Report -- Comprehensive Report  Patient: YUNG AARON  Study date: 2017  Account number: [id number]  MR number: [id number]  : 1950  Gender: Female  Race: W  Case Physician(s):  NETTE Lal M.D.  Fellow:  Rubens Nunez M.D.  Referring Physician:  New Jennings M.D.  INDICATIONS: Initial NSTEMI.  HISTORY: The patient has a history of coronary artery disease. The patient  has hypertension, oral hypoglycemic-treated diabetes, and  medication-treated dyslipidemia.  PROCEDURE:  --  Left heart catheterization.  --  Left coronary angiography.  --  Right coronary angiography.  --  Intervention on OM1: drug-eluting stent.  TECHNIQUE: The risks and alternatives of the procedures and conscious  sedation were explained to the patient and informed consent was obtained.  Cardiac catheterization performed electively. Coronary intervention  performed electively.  Local anesthetic given. Right radial artery access. Left heart  catheterization. Left coronary artery angiography. The vessel was injected  utilizing a catheter. Right coronary artery angiography. The vessel was  injected utilizing a catheter. RADIATION EXPOSURE: 13.2 min. A successful  drug-eluting stent was performed on the 90 % lesion in the 1st obtuse  marginal. Following intervention there was a 1 % residual stenosis.  According to the ACC/AHA classification system, this lesion was a type C  lesion. There was JANES 3 flow before the procedure and JANES 3 flow after  the procedure. There was no dissection. Vessel setup was performed. A 6FR  JL3.5 LAUNCHER guiding catheter was used to intubate the vessel. Vessel  setup was performed. A [employer] UNIVERSAL 190CM wire was used to cross the  lesion. Balloon angioplasty was performed, using a 2.0 X 15 MAVERICK RX  balloon, with 1 inflations and a maximum inflation pressure of 14 blanquita. A  2.50 X 15 NAVEED drug-eluting stentwas placed across the lesion and  deployed at a maximum inflation pressure of 12 blanquiat. A 2.50 X 12 NAVEED  drug-eluting stent was placed across the lesion and deployed at a maximum  inflation pressure of 14 blanquita. Balloon angioplasty was performed, with 1  inflations and a maximum inflation pressure of 10 blanquita.  CONTRAST GIVEN: Omnipaque 37 ml. Omnipaque 69 ml.  MEDICATIONS GIVEN: Midazolam, 1 mg, IV. Fentanyl, 25 mcg, IV. Labetalol  (Trandate), 20 mg, IV. Verapamil (Isoptin, Calan, Covera), 2.5 mg, IA.  Heparin, 3000 units, IA. Heparin, 3000 units, IV. Clopidogrel (Plavix),  300 mg, PO. Aspirin, 81 mg, PO.  CORONARY VESSELS: The coronary circulation is right dominant.  LM:   --  LM: Angiography showed minor luminal irregularities with no flow  limiting lesions.  LAD:   --  LAD: Angiography showed minor luminal irregularities with no  flow limiting lesions.  CX:   --  OM1: There was a 90 % stenosis.  RCA:   --  Mid RCA: There was a 80 % stenosis.  COMPLICATIONS: There were no complications.  DIAGNOSTIC RECOMMENDATIONS: ASA and Plavix for 1 year.  INTERVENTIONAL RECOMMENDATIONS: ASA and Plavix for 1 year.  Prepared and signed by  Jose Raul Pena M.D.  Signed 2017 15:07:23  HEMODYNAMIC TABLES  Pressures:  Baseline  Pressures:  - HR: 81  Pressures:  - Rhythm:  Pressures:  -- Aortic Pressure (S/D/M): 171/82/122  Pressures:  -- Left Ventricle (s/edp): 154/16/--  Outputs:  Baseline  Outputs:  -- CALCULATIONS[age]  Outputs:  -- CALCULATIONS: Body Surface Area: 1.54  Outputs:  -- CALCULATIONS: Height in cm: 150.00  Outputs:  -- CALCULATIONS: Sex: Female  Outputs:  -- CALCULATIONS: Weight in k.00    < end of copied text >      Imaging:    Interpretation of Telemetry:    ASSESSMENT/PLAN  Patient is a 67y old  Female who presents with a chief complaint of chest pain now s/p cardiac cath JACQUELINE 2 OM1 99%; mRCA (80%) via right radial  - staged  for . Pt tolerated the procedure well. Cardiac cath site benign. EKG and blood work reviewed. Staged mRCA today.

## 2017-09-20 NOTE — DISCHARGE NOTE ADULT - MEDICATION SUMMARY - MEDICATIONS TO TAKE
I will START or STAY ON the medications listed below when I get home from the hospital:    aspirin 81 mg oral delayed release tablet  -- 1 tab(s) by mouth once a day  -- Indication: For Coronary Artery Disease    HumaLOG Mix 75/25 subcutaneous suspension  -- 10  subcutaneous once a day  -- Indication: For Diabetes    Lantus 100 units/mL subcutaneous solution  -- 25  subcutaneous once a day (at bedtime)  -- Indication: For Diabetes    Jentadueto 2.5 mg-1000 mg oral tablet  -- 1 tab(s) by mouth 2 times a day, Hold for 2 days, you may resume on Saturday 9/23  -- Indication: For Diabetes    atorvastatin 80 mg oral tablet  -- 1 tab(s) by mouth once a day (at bedtime) MDD:1  -- Indication: For High Cholesterol    clopidogrel 75 mg oral tablet  -- 1 tab(s) by mouth once a day MDD:1  -- Indication: For Coronary Artery Disease    metoprolol tartrate 50 mg oral tablet  -- 1 tab(s) by mouth 2 times a day MDD:2  -- Indication: For High Blood Pressure    amLODIPine 10 mg oral tablet  -- 1 tab(s) by mouth once a day MDD:1  -- Indication: For High Blood Pressure

## 2017-09-20 NOTE — DISCHARGE NOTE ADULT - CARE PROVIDER_API CALL
Jorge Ibanez), Internal Medicine  [address] [place] [address]  Phone: [phone]  Fax: [phone]    Chester Moe (DO), Internal Medicine; Nephrology  [address] [place] [address]  Phone: [phone]  Fax: [phone]

## 2017-09-20 NOTE — DISCHARGE NOTE ADULT - CARE PROVIDERS DIRECT ADDRESSES
[email or web address],DirectAddress_Unknown

## 2017-09-20 NOTE — DISCHARGE NOTE ADULT - HOSPITAL COURSE
68 y/o with FH of CAD (Mom and DAD with CABG in their 70's) and pmh of HTN, DM2 (controlled, AIC 6.8), CKD stage 3,  presented to [place] [facility] on 9/18 at 6pm reporting chest pressure for about 15-20 minutes radiating from left chest to right at rest.  Pt also reports having dyspnea on exertion intermittently while climbing 1 flight of stairs for the past few months and throat discomfort over the past week that she treated with Nexium thinking it was heartburn.  Pt with Trop I 0.169->1.060; K5.5 ->5.8 Cr 1.30->1.29 EGFR 42.  Pt loaded with Plavix 300mg and ASA 81mg with Lovenox seen by Dr. Jennings, Cardiologist and transferred for cardiac cath for further evaluation 2/2 NSTEMI.  Pt denies symptoms presently.  Dr. Pena aware of K 5.8 will assess EDP to assess need for diuretic therapy to decrease potassium.  Will hold ARB for now pt with no arrhythmias on tele.  Patient is s/p JACQUELINE x 2 to OM1 90% on 9/19 and JACQUELINE x 1 to mRCA 80% on 9/20 via RRA access.  Patient tolerated both procedures well, post PCI EKG's without acute changes.  RRA site benign without presence of bleeding/hematoma, patient without complaints.  Patient seen by Dr. Schwarz - Nephrology, plan is for follow-up as outpatient as her creatinine remains stable.  Case discussed with Dr. Ibanez, medications 68 y/o with FH of CAD (Mom and DAD with CABG in their 70's) and pmh of HTN, DM2 (controlled, AIC 6.8), CKD stage 3,  presented to [place] [facility] on 9/18 at 6pm reporting chest pressure for about 15-20 minutes radiating from left chest to right at rest.  Pt also reports having dyspnea on exertion intermittently while climbing 1 flight of stairs for the past few months and throat discomfort over the past week that she treated with Nexium thinking it was heartburn.  Pt with Trop I 0.169->1.060; K5.5 ->5.8 Cr 1.30->1.29 EGFR 42.  Pt loaded with Plavix 300mg and ASA 81mg with Lovenox seen by Dr. Jennings, Cardiologist and transferred for cardiac cath for further evaluation 2/2 NSTEMI.  Pt denies symptoms presently.  Dr. Pena aware of K 5.8 will assess EDP to assess need for diuretic therapy to decrease potassium.  Will hold ARB for now pt with no arrhythmias on tele.  Patient is s/p JACQUELINE x 2 to OM1 90% on 9/19 and JACQUELINE x 1 to mRCA 80% on 9/20 via RRA access.  Patient tolerated both procedures well, post PCI EKG's without acute changes.  RRA site benign without presence of bleeding/hematoma, patient without complaints.  Patient seen by Dr. Schwarz - Nephrology, plan is for follow-up as outpatient as her creatinine remains stable.  Case discussed with Dr. Ibanez, medications adjusted appropriately, patient educated.  As per Dr. Ibanez patient is medically stable for discharge home today.

## 2017-09-20 NOTE — DISCHARGE NOTE ADULT - CARE PLAN
Principal Discharge DX:	Coronary artery disease due to lipid rich plaque  Goal:	Low salt, low fat diet.   Weight management.   Take medications as prescribed.    No smoking.  Follow up appointments with your doctor(s)  as instructed.  Instructions for follow-up, activity and diet:	No heavy lifting for 2 weeks, no strenuous activity  ( pushing/ pulling) no driving for x 2 days,  you may shower 24 hours following procedure but no bathing or swimming for x1  week, no strenuous sex for x 1 week & follow up with your cardiologist in 1-2 week  Secondary Diagnosis:	Type 2 diabetes mellitus without complication, with long-term current use of insulin  Goal:	Your hemoglobin A1C will be at a normal range (normal range is from 6-8)  Instructions for follow-up, activity and diet:	Continue to follow with your primary care MD or your endocrinologist. Discuss what the goal hemoglobin A1C level is for you.  Follow a heart healthy diabetic diet. If you check your fingerstick glucose at home, call your MD if it is greater than 250mg/dL on 2 occasions or less than 100mg/dL on 2 occasions. Know signs of low blood sugar, such as: dizziness, shakiness, sweating, confusion, hunger, nervousness- drink 4 ounces apple juice if occurs and call your doctor. Know early signs of high blood sugar, such as: frequent urination, increased thirst, blurry vision, fatigue, headache - call your doctor if this occurs.  Secondary Diagnosis:	Essential hypertension  Goal:	Your blood pressure will be controlled.  Instructions for follow-up, activity and diet:	Continue with your blood pressure medications; eat a heart healthy diet with low salt diet; exercise regularly (consult with your physician or cardiologist first); maintain a heart healthy weight; if you smoke - quit (A resource to help you stop smoking is the [facility] – phone number [phone].); include healthy ways to manage stress. Continue to follow with your primary care physician or cardiologist.  Secondary Diagnosis:	Hyperlipidemia, unspecified hyperlipidemia type  Goal:	Your LDL cholesterol will be less than 70mg/dL  Instructions for follow-up, activity and diet:	Continue with your cholesterol medications. Eat a heart healthy diet that is low in saturated fats and salt, and includes whole grains, fruits, vegetables and lean protein; exercise regularly (consult with your physician or cardiologist first); maintain a heart healthy weight; if you smoke - quit (A resource to help you stop smoking is the [facility] – phone number [phone].). Continue to follow with your primary physician or cardiologist.

## 2017-09-20 NOTE — DISCHARGE NOTE ADULT - SECONDARY DIAGNOSIS.
Hyperlipidemia, unspecified hyperlipidemia type Type 2 diabetes mellitus without complication, with long-term current use of insulin Essential hypertension

## 2017-09-21 PROBLEM — Z00.00 ENCOUNTER FOR PREVENTIVE HEALTH EXAMINATION: Status: ACTIVE | Noted: 2017-09-21

## 2017-09-25 ENCOUNTER — TRANSCRIPTION ENCOUNTER (OUTPATIENT)
Age: 67
End: 2017-09-25

## 2017-09-25 RX ORDER — VALSARTAN 80 MG/1
1 TABLET ORAL
Qty: 0 | Refills: 0 | COMMUNITY

## 2017-09-25 RX ORDER — AMLODIPINE BESYLATE 2.5 MG/1
1 TABLET ORAL
Qty: 0 | Refills: 0 | COMMUNITY

## 2017-09-25 RX ORDER — LINAGLIPTIN AND METFORMIN HYDROCHLORIDE 2.5; 85 MG/1; MG/1
1 TABLET, FILM COATED ORAL
Qty: 0 | Refills: 0 | COMMUNITY

## 2017-09-26 ENCOUNTER — NON-APPOINTMENT (OUTPATIENT)
Age: 67
End: 2017-09-26

## 2017-09-26 ENCOUNTER — APPOINTMENT (OUTPATIENT)
Dept: CARDIOLOGY | Facility: CLINIC | Age: 67
End: 2017-09-26
Payer: MEDICARE

## 2017-09-26 VITALS
HEART RATE: 73 BPM | WEIGHT: 132 LBS | HEIGHT: 59 IN | OXYGEN SATURATION: 98 % | BODY MASS INDEX: 26.61 KG/M2 | SYSTOLIC BLOOD PRESSURE: 158 MMHG | DIASTOLIC BLOOD PRESSURE: 81 MMHG

## 2017-09-26 DIAGNOSIS — E11.9 TYPE 2 DIABETES MELLITUS W/OUT COMPLICATIONS: ICD-10-CM

## 2017-09-26 DIAGNOSIS — D64.9 ANEMIA, UNSPECIFIED: ICD-10-CM

## 2017-09-26 DIAGNOSIS — Z78.9 OTHER SPECIFIED HEALTH STATUS: ICD-10-CM

## 2017-09-26 PROCEDURE — 99214 OFFICE O/P EST MOD 30 MIN: CPT

## 2017-09-26 PROCEDURE — 93000 ELECTROCARDIOGRAM COMPLETE: CPT

## 2017-09-26 RX ORDER — PEN NEEDLE, DIABETIC 29 G X1/2"
32G X 4 MM NEEDLE, DISPOSABLE MISCELLANEOUS
Qty: 200 | Refills: 0 | Status: ACTIVE | COMMUNITY
Start: 2017-04-24

## 2017-09-26 RX ORDER — INSULIN GLARGINE 100 [IU]/ML
100 INJECTION, SOLUTION SUBCUTANEOUS
Qty: 15 | Refills: 0 | Status: ACTIVE | COMMUNITY
Start: 2017-08-21

## 2017-09-26 RX ORDER — BLOOD SUGAR DIAGNOSTIC
STRIP MISCELLANEOUS
Qty: 300 | Refills: 0 | Status: ACTIVE | COMMUNITY
Start: 2017-04-24

## 2017-09-26 RX ORDER — METOPROLOL TARTRATE 50 MG/1
50 TABLET, FILM COATED ORAL
Qty: 60 | Refills: 5 | Status: DISCONTINUED | COMMUNITY
Start: 2017-09-20 | End: 2017-09-26

## 2017-09-26 RX ORDER — ASPIRIN 81 MG
81 TABLET, DELAYED RELEASE (ENTERIC COATED) ORAL
Refills: 0 | Status: ACTIVE | COMMUNITY

## 2017-09-26 RX ORDER — AMLODIPINE BESYLATE 5 MG/1
5 TABLET ORAL
Qty: 90 | Refills: 0 | Status: COMPLETED | COMMUNITY
Start: 2017-05-15

## 2017-10-12 ENCOUNTER — APPOINTMENT (OUTPATIENT)
Dept: CARDIOLOGY | Facility: CLINIC | Age: 67
End: 2017-10-12
Payer: MEDICARE

## 2017-10-12 PROCEDURE — 93306 TTE W/DOPPLER COMPLETE: CPT

## 2017-10-12 RX ORDER — AMLODIPINE BESYLATE 10 MG/1
10 TABLET ORAL
Qty: 30 | Refills: 0 | Status: DISCONTINUED | COMMUNITY
Start: 2017-09-20 | End: 2017-10-12

## 2017-10-26 ENCOUNTER — APPOINTMENT (OUTPATIENT)
Dept: CARDIOLOGY | Facility: CLINIC | Age: 67
End: 2017-10-26
Payer: MEDICARE

## 2017-10-26 ENCOUNTER — NON-APPOINTMENT (OUTPATIENT)
Age: 67
End: 2017-10-26

## 2017-10-26 VITALS
SYSTOLIC BLOOD PRESSURE: 150 MMHG | OXYGEN SATURATION: 98 % | BODY MASS INDEX: 26.81 KG/M2 | HEART RATE: 76 BPM | DIASTOLIC BLOOD PRESSURE: 75 MMHG | WEIGHT: 133 LBS | HEIGHT: 59 IN

## 2017-10-26 PROCEDURE — 99214 OFFICE O/P EST MOD 30 MIN: CPT

## 2017-10-26 PROCEDURE — 93000 ELECTROCARDIOGRAM COMPLETE: CPT

## 2017-12-06 ENCOUNTER — RX RENEWAL (OUTPATIENT)
Age: 67
End: 2017-12-06

## 2017-12-22 ENCOUNTER — RX RENEWAL (OUTPATIENT)
Age: 67
End: 2017-12-22

## 2017-12-26 ENCOUNTER — MOBILE ON CALL (OUTPATIENT)
Age: 67
End: 2017-12-26

## 2017-12-27 ENCOUNTER — RX RENEWAL (OUTPATIENT)
Age: 67
End: 2017-12-27

## 2018-01-08 ENCOUNTER — RX RENEWAL (OUTPATIENT)
Age: 68
End: 2018-01-08

## 2018-01-08 RX ORDER — FELODIPINE 5 MG/1
5 TABLET, EXTENDED RELEASE ORAL DAILY
Qty: 90 | Refills: 3 | Status: DISCONTINUED | COMMUNITY
Start: 2017-10-12 | End: 2018-01-08

## 2018-01-16 ENCOUNTER — NON-APPOINTMENT (OUTPATIENT)
Age: 68
End: 2018-01-16

## 2018-01-16 ENCOUNTER — APPOINTMENT (OUTPATIENT)
Dept: CARDIOLOGY | Facility: CLINIC | Age: 68
End: 2018-01-16
Payer: MEDICARE

## 2018-01-16 ENCOUNTER — RX RENEWAL (OUTPATIENT)
Age: 68
End: 2018-01-16

## 2018-01-16 VITALS — DIASTOLIC BLOOD PRESSURE: 100 MMHG | SYSTOLIC BLOOD PRESSURE: 164 MMHG

## 2018-01-16 VITALS
HEART RATE: 74 BPM | WEIGHT: 131 LBS | SYSTOLIC BLOOD PRESSURE: 206 MMHG | OXYGEN SATURATION: 98 % | DIASTOLIC BLOOD PRESSURE: 83 MMHG | HEIGHT: 59 IN | BODY MASS INDEX: 26.41 KG/M2

## 2018-01-16 PROCEDURE — 99214 OFFICE O/P EST MOD 30 MIN: CPT

## 2018-01-16 PROCEDURE — 93000 ELECTROCARDIOGRAM COMPLETE: CPT

## 2018-01-29 ENCOUNTER — APPOINTMENT (OUTPATIENT)
Dept: CARDIOLOGY | Facility: CLINIC | Age: 68
End: 2018-01-29
Payer: MEDICARE

## 2018-01-29 VITALS
HEIGHT: 59 IN | SYSTOLIC BLOOD PRESSURE: 145 MMHG | WEIGHT: 131 LBS | HEART RATE: 81 BPM | DIASTOLIC BLOOD PRESSURE: 79 MMHG | BODY MASS INDEX: 26.41 KG/M2 | OXYGEN SATURATION: 100 %

## 2018-01-29 PROCEDURE — 93000 ELECTROCARDIOGRAM COMPLETE: CPT

## 2018-01-29 PROCEDURE — 99214 OFFICE O/P EST MOD 30 MIN: CPT

## 2018-01-29 RX ORDER — CHLORTHALIDONE 50 MG/1
50 TABLET ORAL
Qty: 90 | Refills: 5 | Status: DISCONTINUED | COMMUNITY
Start: 2018-01-08 | End: 2018-01-29

## 2018-02-06 ENCOUNTER — OUTPATIENT (OUTPATIENT)
Dept: OUTPATIENT SERVICES | Facility: HOSPITAL | Age: 68
LOS: 1 days | End: 2018-02-06
Payer: MEDICARE

## 2018-02-06 ENCOUNTER — RESULT REVIEW (OUTPATIENT)
Age: 68
End: 2018-02-06

## 2018-02-06 ENCOUNTER — TRANSCRIPTION ENCOUNTER (OUTPATIENT)
Age: 68
End: 2018-02-06

## 2018-02-06 DIAGNOSIS — Z98.49 CATARACT EXTRACTION STATUS, UNSPECIFIED EYE: Chronic | ICD-10-CM

## 2018-02-06 DIAGNOSIS — Z98.890 OTHER SPECIFIED POSTPROCEDURAL STATES: Chronic | ICD-10-CM

## 2018-02-06 DIAGNOSIS — Z98.891 HISTORY OF UTERINE SCAR FROM PREVIOUS SURGERY: Chronic | ICD-10-CM

## 2018-02-06 DIAGNOSIS — R13.10 DYSPHAGIA, UNSPECIFIED: ICD-10-CM

## 2018-02-06 DIAGNOSIS — Z12.11 ENCOUNTER FOR SCREENING FOR MALIGNANT NEOPLASM OF COLON: ICD-10-CM

## 2018-02-06 PROCEDURE — 88312 SPECIAL STAINS GROUP 1: CPT | Mod: 26

## 2018-02-06 PROCEDURE — 88305 TISSUE EXAM BY PATHOLOGIST: CPT | Mod: 26

## 2018-02-06 PROCEDURE — 88313 SPECIAL STAINS GROUP 2: CPT | Mod: 26

## 2018-02-06 PROCEDURE — 88305 TISSUE EXAM BY PATHOLOGIST: CPT

## 2018-02-06 PROCEDURE — 88312 SPECIAL STAINS GROUP 1: CPT

## 2018-02-06 PROCEDURE — 43239 EGD BIOPSY SINGLE/MULTIPLE: CPT

## 2018-02-06 PROCEDURE — 45378 DIAGNOSTIC COLONOSCOPY: CPT

## 2018-02-06 PROCEDURE — 88313 SPECIAL STAINS GROUP 2: CPT

## 2018-02-12 ENCOUNTER — RX RENEWAL (OUTPATIENT)
Age: 68
End: 2018-02-12

## 2018-03-06 ENCOUNTER — APPOINTMENT (OUTPATIENT)
Dept: CARDIOLOGY | Facility: CLINIC | Age: 68
End: 2018-03-06
Payer: MEDICARE

## 2018-03-06 ENCOUNTER — RX RENEWAL (OUTPATIENT)
Age: 68
End: 2018-03-06

## 2018-03-06 VITALS — SYSTOLIC BLOOD PRESSURE: 170 MMHG | DIASTOLIC BLOOD PRESSURE: 80 MMHG

## 2018-03-06 VITALS
HEIGHT: 59 IN | DIASTOLIC BLOOD PRESSURE: 93 MMHG | OXYGEN SATURATION: 99 % | SYSTOLIC BLOOD PRESSURE: 194 MMHG | WEIGHT: 130 LBS | HEART RATE: 67 BPM | BODY MASS INDEX: 26.21 KG/M2

## 2018-03-06 DIAGNOSIS — K21.9 GASTRO-ESOPHAGEAL REFLUX DISEASE W/OUT ESOPHAGITIS: ICD-10-CM

## 2018-03-06 PROCEDURE — 99214 OFFICE O/P EST MOD 30 MIN: CPT

## 2018-03-06 RX ORDER — VALSARTAN 80 MG/1
80 TABLET, COATED ORAL
Qty: 30 | Refills: 5 | Status: DISCONTINUED | COMMUNITY
Start: 2017-09-26 | End: 2018-03-06

## 2018-03-06 RX ORDER — EMPAGLIFLOZIN 10 MG/1
10 TABLET, FILM COATED ORAL
Qty: 30 | Refills: 0 | Status: DISCONTINUED | COMMUNITY
Start: 2017-04-24 | End: 2018-03-06

## 2018-03-06 RX ORDER — LINAGLIPTIN AND METFORMIN HYDROCHLORIDE 2.5; 1 MG/1; MG/1
2.5-1 TABLET, FILM COATED ORAL
Qty: 180 | Refills: 0 | Status: DISCONTINUED | COMMUNITY
Start: 2017-07-24 | End: 2018-03-06

## 2018-03-26 ENCOUNTER — MEDICATION RENEWAL (OUTPATIENT)
Age: 68
End: 2018-03-26

## 2018-03-29 ENCOUNTER — APPOINTMENT (OUTPATIENT)
Dept: CARDIOLOGY | Facility: CLINIC | Age: 68
End: 2018-03-29
Payer: MEDICARE

## 2018-03-29 ENCOUNTER — EMERGENCY (EMERGENCY)
Facility: HOSPITAL | Age: 68
LOS: 1 days | Discharge: HOME | End: 2018-03-29
Attending: EMERGENCY MEDICINE | Admitting: EMERGENCY MEDICINE
Payer: MEDICARE

## 2018-03-29 ENCOUNTER — NON-APPOINTMENT (OUTPATIENT)
Age: 68
End: 2018-03-29

## 2018-03-29 VITALS
TEMPERATURE: 98 F | HEART RATE: 78 BPM | DIASTOLIC BLOOD PRESSURE: 89 MMHG | WEIGHT: 136.91 LBS | HEIGHT: 59 IN | RESPIRATION RATE: 16 BRPM | OXYGEN SATURATION: 99 % | SYSTOLIC BLOOD PRESSURE: 230 MMHG

## 2018-03-29 VITALS
OXYGEN SATURATION: 99 % | DIASTOLIC BLOOD PRESSURE: 94 MMHG | BODY MASS INDEX: 27.62 KG/M2 | WEIGHT: 137 LBS | HEART RATE: 72 BPM | SYSTOLIC BLOOD PRESSURE: 205 MMHG | HEIGHT: 59 IN

## 2018-03-29 VITALS — SYSTOLIC BLOOD PRESSURE: 170 MMHG | DIASTOLIC BLOOD PRESSURE: 90 MMHG

## 2018-03-29 VITALS
SYSTOLIC BLOOD PRESSURE: 155 MMHG | TEMPERATURE: 98 F | OXYGEN SATURATION: 98 % | HEART RATE: 70 BPM | RESPIRATION RATE: 16 BRPM | DIASTOLIC BLOOD PRESSURE: 74 MMHG

## 2018-03-29 DIAGNOSIS — Z98.890 OTHER SPECIFIED POSTPROCEDURAL STATES: Chronic | ICD-10-CM

## 2018-03-29 DIAGNOSIS — Z98.891 HISTORY OF UTERINE SCAR FROM PREVIOUS SURGERY: Chronic | ICD-10-CM

## 2018-03-29 DIAGNOSIS — Z98.49 CATARACT EXTRACTION STATUS, UNSPECIFIED EYE: Chronic | ICD-10-CM

## 2018-03-29 LAB
ALBUMIN SERPL ELPH-MCNC: 2.9 G/DL — LOW (ref 3.3–5)
ALP SERPL-CCNC: 63 U/L — SIGNIFICANT CHANGE UP (ref 40–120)
ALT FLD-CCNC: 67 U/L — SIGNIFICANT CHANGE UP (ref 12–78)
ANION GAP SERPL CALC-SCNC: 7 MMOL/L — SIGNIFICANT CHANGE UP (ref 5–17)
ANION GAP SERPL CALC-SCNC: 7 MMOL/L — SIGNIFICANT CHANGE UP (ref 5–17)
APTT BLD: 35.6 SEC — SIGNIFICANT CHANGE UP (ref 27.5–37.4)
AST SERPL-CCNC: 35 U/L — SIGNIFICANT CHANGE UP (ref 15–37)
BASOPHILS # BLD AUTO: 0.1 K/UL — SIGNIFICANT CHANGE UP (ref 0–0.2)
BASOPHILS NFR BLD AUTO: 1.6 % — SIGNIFICANT CHANGE UP (ref 0–2)
BILIRUB SERPL-MCNC: 0.2 MG/DL — SIGNIFICANT CHANGE UP (ref 0.2–1.2)
BUN SERPL-MCNC: 22 MG/DL — SIGNIFICANT CHANGE UP (ref 7–23)
BUN SERPL-MCNC: 23 MG/DL — SIGNIFICANT CHANGE UP (ref 7–23)
CALCIUM SERPL-MCNC: 8.2 MG/DL — LOW (ref 8.5–10.1)
CALCIUM SERPL-MCNC: 8.6 MG/DL — SIGNIFICANT CHANGE UP (ref 8.5–10.1)
CHLORIDE SERPL-SCNC: 113 MMOL/L — HIGH (ref 96–108)
CHLORIDE SERPL-SCNC: 114 MMOL/L — HIGH (ref 96–108)
CK MB BLD-MCNC: 2.8 % — SIGNIFICANT CHANGE UP (ref 0–3.5)
CK MB CFR SERPL CALC: 1.1 NG/ML — SIGNIFICANT CHANGE UP (ref 0–3.6)
CK SERPL-CCNC: 40 U/L — SIGNIFICANT CHANGE UP (ref 26–192)
CO2 SERPL-SCNC: 18 MMOL/L — LOW (ref 22–31)
CO2 SERPL-SCNC: 20 MMOL/L — LOW (ref 22–31)
CREAT SERPL-MCNC: 1.8 MG/DL — HIGH (ref 0.5–1.3)
CREAT SERPL-MCNC: 1.8 MG/DL — HIGH (ref 0.5–1.3)
DAT IGG-SP REAG RBC-IMP: SIGNIFICANT CHANGE UP
DIR ANTIGLOB POLYSPECIFIC INTERPRETATION: SIGNIFICANT CHANGE UP
EOSINOPHIL # BLD AUTO: 0.2 K/UL — SIGNIFICANT CHANGE UP (ref 0–0.5)
EOSINOPHIL NFR BLD AUTO: 2 % — SIGNIFICANT CHANGE UP (ref 0–6)
GLUCOSE SERPL-MCNC: 213 MG/DL — HIGH (ref 70–99)
GLUCOSE SERPL-MCNC: 215 MG/DL — HIGH (ref 70–99)
HCT VFR BLD CALC: 25.7 % — LOW (ref 34.5–45)
HGB BLD-MCNC: 8.3 G/DL — LOW (ref 11.5–15.5)
IAT COMP-SP REAG SERPL QL: SIGNIFICANT CHANGE UP
INR BLD: 1.08 RATIO — SIGNIFICANT CHANGE UP (ref 0.88–1.16)
LIDOCAIN IGE QN: 131 U/L — SIGNIFICANT CHANGE UP (ref 73–393)
LYMPHOCYTES # BLD AUTO: 2 K/UL — SIGNIFICANT CHANGE UP (ref 1–3.3)
LYMPHOCYTES # BLD AUTO: 21.2 % — SIGNIFICANT CHANGE UP (ref 13–44)
MCHC RBC-ENTMCNC: 28.5 PG — SIGNIFICANT CHANGE UP (ref 27–34)
MCHC RBC-ENTMCNC: 32.1 GM/DL — SIGNIFICANT CHANGE UP (ref 32–36)
MCV RBC AUTO: 88.7 FL — SIGNIFICANT CHANGE UP (ref 80–100)
MONOCYTES # BLD AUTO: 0.8 K/UL — SIGNIFICANT CHANGE UP (ref 0–0.9)
MONOCYTES NFR BLD AUTO: 9 % — SIGNIFICANT CHANGE UP (ref 1–9)
NEUTROPHILS # BLD AUTO: 6.2 K/UL — SIGNIFICANT CHANGE UP (ref 1.8–7.4)
NEUTROPHILS NFR BLD AUTO: 66.2 % — SIGNIFICANT CHANGE UP (ref 43–77)
NT-PROBNP SERPL-SCNC: 8449 PG/ML — HIGH (ref 0–125)
PLATELET # BLD AUTO: 270 K/UL — SIGNIFICANT CHANGE UP (ref 150–400)
POTASSIUM SERPL-MCNC: 5.8 MMOL/L — HIGH (ref 3.5–5.3)
POTASSIUM SERPL-MCNC: 5.8 MMOL/L — HIGH (ref 3.5–5.3)
POTASSIUM SERPL-SCNC: 5.8 MMOL/L — HIGH (ref 3.5–5.3)
POTASSIUM SERPL-SCNC: 5.8 MMOL/L — HIGH (ref 3.5–5.3)
PROT SERPL-MCNC: 6.4 G/DL — SIGNIFICANT CHANGE UP (ref 6–8.3)
PROTHROM AB SERPL-ACNC: 11.8 SEC — SIGNIFICANT CHANGE UP (ref 9.8–12.7)
RBC # BLD: 2.89 M/UL — LOW (ref 3.8–5.2)
RBC # FLD: 14.8 % — HIGH (ref 10.3–14.5)
SODIUM SERPL-SCNC: 138 MMOL/L — SIGNIFICANT CHANGE UP (ref 135–145)
SODIUM SERPL-SCNC: 141 MMOL/L — SIGNIFICANT CHANGE UP (ref 135–145)
TROPONIN I SERPL-MCNC: <.015 NG/ML — SIGNIFICANT CHANGE UP (ref 0.01–0.04)
WBC # BLD: 9.3 K/UL — SIGNIFICANT CHANGE UP (ref 3.8–10.5)
WBC # FLD AUTO: 9.3 K/UL — SIGNIFICANT CHANGE UP (ref 3.8–10.5)

## 2018-03-29 PROCEDURE — 36415 COLL VENOUS BLD VENIPUNCTURE: CPT

## 2018-03-29 PROCEDURE — 86870 RBC ANTIBODY IDENTIFICATION: CPT

## 2018-03-29 PROCEDURE — 83880 ASSAY OF NATRIURETIC PEPTIDE: CPT

## 2018-03-29 PROCEDURE — 99215 OFFICE O/P EST HI 40 MIN: CPT

## 2018-03-29 PROCEDURE — 85730 THROMBOPLASTIN TIME PARTIAL: CPT

## 2018-03-29 PROCEDURE — 71045 X-RAY EXAM CHEST 1 VIEW: CPT

## 2018-03-29 PROCEDURE — 71045 X-RAY EXAM CHEST 1 VIEW: CPT | Mod: 26

## 2018-03-29 PROCEDURE — 82550 ASSAY OF CK (CPK): CPT

## 2018-03-29 PROCEDURE — 86900 BLOOD TYPING SEROLOGIC ABO: CPT

## 2018-03-29 PROCEDURE — 99285 EMERGENCY DEPT VISIT HI MDM: CPT

## 2018-03-29 PROCEDURE — 93005 ELECTROCARDIOGRAM TRACING: CPT

## 2018-03-29 PROCEDURE — 85027 COMPLETE CBC AUTOMATED: CPT

## 2018-03-29 PROCEDURE — 83690 ASSAY OF LIPASE: CPT

## 2018-03-29 PROCEDURE — 80053 COMPREHEN METABOLIC PANEL: CPT

## 2018-03-29 PROCEDURE — 80048 BASIC METABOLIC PNL TOTAL CA: CPT

## 2018-03-29 PROCEDURE — 86077 PHYS BLOOD BANK SERV XMATCH: CPT

## 2018-03-29 PROCEDURE — 86880 COOMBS TEST DIRECT: CPT

## 2018-03-29 PROCEDURE — 93000 ELECTROCARDIOGRAM COMPLETE: CPT

## 2018-03-29 PROCEDURE — 82553 CREATINE MB FRACTION: CPT

## 2018-03-29 PROCEDURE — 86850 RBC ANTIBODY SCREEN: CPT

## 2018-03-29 PROCEDURE — 84484 ASSAY OF TROPONIN QUANT: CPT

## 2018-03-29 PROCEDURE — 99284 EMERGENCY DEPT VISIT MOD MDM: CPT | Mod: 25

## 2018-03-29 PROCEDURE — 96374 THER/PROPH/DIAG INJ IV PUSH: CPT

## 2018-03-29 PROCEDURE — 85610 PROTHROMBIN TIME: CPT

## 2018-03-29 PROCEDURE — 86901 BLOOD TYPING SEROLOGIC RH(D): CPT

## 2018-03-29 RX ORDER — FUROSEMIDE 40 MG
1 TABLET ORAL
Qty: 14 | Refills: 0 | OUTPATIENT
Start: 2018-03-29 | End: 2018-04-11

## 2018-03-29 RX ORDER — VALSARTAN 80 MG/1
0 TABLET ORAL
Qty: 0 | Refills: 0 | COMMUNITY

## 2018-03-29 RX ORDER — FUROSEMIDE 40 MG
40 TABLET ORAL ONCE
Qty: 0 | Refills: 0 | Status: COMPLETED | OUTPATIENT
Start: 2018-03-29 | End: 2018-03-29

## 2018-03-29 RX ORDER — HYDRALAZINE HCL 50 MG
0 TABLET ORAL
Qty: 0 | Refills: 0 | COMMUNITY

## 2018-03-29 RX ORDER — SUCRALFATE 1 G
0 TABLET ORAL
Qty: 0 | Refills: 0 | COMMUNITY

## 2018-03-29 RX ORDER — LINAGLIPTIN AND METFORMIN HYDROCHLORIDE 2.5; 85 MG/1; MG/1
1 TABLET, FILM COATED ORAL
Qty: 0 | Refills: 0 | COMMUNITY

## 2018-03-29 RX ORDER — INSULIN GLARGINE 100 [IU]/ML
25 INJECTION, SOLUTION SUBCUTANEOUS
Qty: 0 | Refills: 0 | COMMUNITY

## 2018-03-29 RX ADMIN — Medication 40 MILLIGRAM(S): at 18:38

## 2018-03-29 NOTE — ED PROVIDER NOTE - OBJECTIVE STATEMENT
66 yo female hx of CKD stage 3, DM, HTN, anemia sent in by her cardiologist Dr. Jorge Ibanez for dyspnea.  Patient has baseline hgb of 8.  No fever/chills.  PMD Dr. Chester Moe (also her nephrologist).  No chest pain.  +JOHNSON.

## 2018-03-29 NOTE — ED PROVIDER NOTE - CONSTITUTIONAL, MLM
pale appearing, awake, alert, oriented to person, place, time/situation and in no apparent distress. normal...

## 2018-03-29 NOTE — ED ADULT NURSE REASSESSMENT NOTE - NS ED NURSE REASSESS COMMENT FT1
pt with repeat blood work drawned for repeat  potassium and vital  signs stable and pt reavaluated and d/c home to follow up

## 2018-03-29 NOTE — ED PROVIDER NOTE - PROGRESS NOTE DETAILS
discussed case with Dr. Ibanez, if doesn't need transfusion, recommend stopping losartan and give IV lasix and f/u in office. discussed lab results with Dr. Ibanez, recommend 40mg IV lasix, and d/c on 40mg PO lasix, and f/u in office labs and imaging explained to patient, patient states her last potassium was 6.2, so now it has improved, and her h/h today is better too.  Last Cr was 1.6-1.7. patient feeling better, wants to go home and f/u with Dr. Ibanez as outpatient, understands to return if symptoms get worse.

## 2018-03-29 NOTE — ED ADULT NURSE NOTE - OBJECTIVE STATEMENT
Pt reports JOHNSON beginning on 3/25, relieved with rest. Pt reports JOHNSON was more severe on 3/25 than it is today but it does persist. Pt reports she went to see her cardiologist today (Dr. Ibanez), BP was reading in 200s systolic on electronic cuffs but was 170/90 manually, was told to come to ED to R/O worsening anemia. Pt reports her baseline Hgb has been 8.0-8.2 over the past year, denies Hx transfusions. BP reading 230/90 electronically in ED, pt becomes extremely anxious when BP cuff tightens. /90 manually on same arm (left). Pt denies CP, palpitations, SOB at rest, dizziness or weakness.

## 2018-03-29 NOTE — ED ADULT NURSE NOTE - PSH
History of colonoscopy    S/P carpal tunnel release    S/P  section  x 2  Status post cataract extraction and insertion of intraocular lens, unspecified laterality

## 2018-04-02 ENCOUNTER — NON-APPOINTMENT (OUTPATIENT)
Age: 68
End: 2018-04-02

## 2018-04-10 ENCOUNTER — NON-APPOINTMENT (OUTPATIENT)
Age: 68
End: 2018-04-10

## 2018-04-10 ENCOUNTER — APPOINTMENT (OUTPATIENT)
Dept: CARDIOLOGY | Facility: CLINIC | Age: 68
End: 2018-04-10
Payer: MEDICARE

## 2018-04-10 VITALS
BODY MASS INDEX: 26.61 KG/M2 | SYSTOLIC BLOOD PRESSURE: 176 MMHG | HEIGHT: 59 IN | DIASTOLIC BLOOD PRESSURE: 78 MMHG | WEIGHT: 132 LBS | HEART RATE: 71 BPM | OXYGEN SATURATION: 98 %

## 2018-04-10 VITALS — DIASTOLIC BLOOD PRESSURE: 70 MMHG | SYSTOLIC BLOOD PRESSURE: 150 MMHG

## 2018-04-10 PROCEDURE — 99214 OFFICE O/P EST MOD 30 MIN: CPT

## 2018-04-10 PROCEDURE — 93000 ELECTROCARDIOGRAM COMPLETE: CPT

## 2018-04-10 RX ORDER — CLONIDINE HYDROCHLORIDE 0.1 MG/1
0.1 TABLET ORAL
Qty: 90 | Refills: 0 | Status: DISCONTINUED | COMMUNITY
Start: 2018-01-16

## 2018-04-10 RX ORDER — CHLORTHALIDONE 25 MG/1
25 TABLET ORAL
Qty: 90 | Refills: 0 | Status: DISCONTINUED | COMMUNITY
Start: 2018-01-08

## 2018-04-10 RX ORDER — CARVEDILOL 12.5 MG/1
12.5 TABLET, FILM COATED ORAL
Qty: 60 | Refills: 0 | Status: DISCONTINUED | COMMUNITY
Start: 2017-09-26

## 2018-04-11 ENCOUNTER — RX RENEWAL (OUTPATIENT)
Age: 68
End: 2018-04-11

## 2018-04-25 ENCOUNTER — RX RENEWAL (OUTPATIENT)
Age: 68
End: 2018-04-25

## 2018-04-26 ENCOUNTER — APPOINTMENT (OUTPATIENT)
Dept: CARDIOLOGY | Facility: CLINIC | Age: 68
End: 2018-04-26
Payer: MEDICARE

## 2018-04-26 PROCEDURE — 93306 TTE W/DOPPLER COMPLETE: CPT

## 2018-04-30 ENCOUNTER — APPOINTMENT (OUTPATIENT)
Dept: CARDIOLOGY | Facility: CLINIC | Age: 68
End: 2018-04-30
Payer: MEDICARE

## 2018-04-30 PROCEDURE — A9500: CPT

## 2018-04-30 PROCEDURE — 93015 CV STRESS TEST SUPVJ I&R: CPT

## 2018-04-30 PROCEDURE — 78452 HT MUSCLE IMAGE SPECT MULT: CPT

## 2018-05-09 ENCOUNTER — RX RENEWAL (OUTPATIENT)
Age: 68
End: 2018-05-09

## 2018-05-11 ENCOUNTER — APPOINTMENT (OUTPATIENT)
Dept: CARDIOLOGY | Facility: CLINIC | Age: 68
End: 2018-05-11
Payer: MEDICARE

## 2018-05-11 VITALS
BODY MASS INDEX: 27.21 KG/M2 | HEIGHT: 59 IN | WEIGHT: 135 LBS | SYSTOLIC BLOOD PRESSURE: 124 MMHG | OXYGEN SATURATION: 96 % | HEART RATE: 82 BPM | DIASTOLIC BLOOD PRESSURE: 69 MMHG

## 2018-05-11 PROCEDURE — 93000 ELECTROCARDIOGRAM COMPLETE: CPT

## 2018-05-11 PROCEDURE — 99214 OFFICE O/P EST MOD 30 MIN: CPT

## 2018-05-31 ENCOUNTER — NON-APPOINTMENT (OUTPATIENT)
Age: 68
End: 2018-05-31

## 2018-05-31 ENCOUNTER — APPOINTMENT (OUTPATIENT)
Dept: CARDIOLOGY | Facility: CLINIC | Age: 68
End: 2018-05-31
Payer: MEDICARE

## 2018-05-31 VITALS
OXYGEN SATURATION: 98 % | HEIGHT: 59 IN | BODY MASS INDEX: 26.61 KG/M2 | HEART RATE: 68 BPM | WEIGHT: 132 LBS | SYSTOLIC BLOOD PRESSURE: 156 MMHG | DIASTOLIC BLOOD PRESSURE: 75 MMHG

## 2018-05-31 DIAGNOSIS — R09.89 OTHER SPECIFIED SYMPTOMS AND SIGNS INVOLVING THE CIRCULATORY AND RESPIRATORY SYSTEMS: ICD-10-CM

## 2018-05-31 PROCEDURE — 93000 ELECTROCARDIOGRAM COMPLETE: CPT

## 2018-05-31 PROCEDURE — 99215 OFFICE O/P EST HI 40 MIN: CPT

## 2018-06-02 ENCOUNTER — APPOINTMENT (OUTPATIENT)
Dept: CARDIOLOGY | Facility: CLINIC | Age: 68
End: 2018-06-02
Payer: MEDICARE

## 2018-06-02 PROCEDURE — 93880 EXTRACRANIAL BILAT STUDY: CPT

## 2018-07-09 ENCOUNTER — APPOINTMENT (OUTPATIENT)
Dept: CARDIOLOGY | Facility: CLINIC | Age: 68
End: 2018-07-09
Payer: MEDICARE

## 2018-07-09 ENCOUNTER — NON-APPOINTMENT (OUTPATIENT)
Age: 68
End: 2018-07-09

## 2018-07-09 VITALS
DIASTOLIC BLOOD PRESSURE: 77 MMHG | BODY MASS INDEX: 26.81 KG/M2 | SYSTOLIC BLOOD PRESSURE: 202 MMHG | HEIGHT: 59 IN | HEART RATE: 71 BPM | WEIGHT: 133 LBS | OXYGEN SATURATION: 98 %

## 2018-07-09 PROCEDURE — 99214 OFFICE O/P EST MOD 30 MIN: CPT

## 2018-07-09 PROCEDURE — 93000 ELECTROCARDIOGRAM COMPLETE: CPT

## 2018-07-23 PROBLEM — Z78.9 ALCOHOL USE: Status: INACTIVE | Noted: 2017-09-26

## 2018-08-03 ENCOUNTER — APPOINTMENT (OUTPATIENT)
Dept: CARDIOLOGY | Facility: CLINIC | Age: 68
End: 2018-08-03
Payer: MEDICARE

## 2018-08-03 ENCOUNTER — NON-APPOINTMENT (OUTPATIENT)
Age: 68
End: 2018-08-03

## 2018-08-03 VITALS
HEART RATE: 76 BPM | SYSTOLIC BLOOD PRESSURE: 147 MMHG | BODY MASS INDEX: 27.01 KG/M2 | OXYGEN SATURATION: 98 % | DIASTOLIC BLOOD PRESSURE: 71 MMHG | HEIGHT: 59 IN | WEIGHT: 134 LBS

## 2018-08-03 VITALS — SYSTOLIC BLOOD PRESSURE: 134 MMHG | DIASTOLIC BLOOD PRESSURE: 70 MMHG

## 2018-08-03 PROBLEM — N18.3 CHRONIC KIDNEY DISEASE, STAGE 3 (MODERATE): Chronic | Status: ACTIVE | Noted: 2017-09-19

## 2018-08-03 PROCEDURE — 99214 OFFICE O/P EST MOD 30 MIN: CPT

## 2018-08-03 PROCEDURE — 93000 ELECTROCARDIOGRAM COMPLETE: CPT

## 2018-08-08 ENCOUNTER — APPOINTMENT (OUTPATIENT)
Dept: CARDIOLOGY | Facility: CLINIC | Age: 68
End: 2018-08-08
Payer: MEDICARE

## 2018-08-08 PROCEDURE — 93790 AMBL BP MNTR W/SW I&R: CPT

## 2018-09-18 ENCOUNTER — APPOINTMENT (OUTPATIENT)
Dept: CARDIOLOGY | Facility: CLINIC | Age: 68
End: 2018-09-18
Payer: MEDICARE

## 2018-09-18 ENCOUNTER — NON-APPOINTMENT (OUTPATIENT)
Age: 68
End: 2018-09-18

## 2018-09-18 VITALS
HEIGHT: 59 IN | SYSTOLIC BLOOD PRESSURE: 157 MMHG | DIASTOLIC BLOOD PRESSURE: 71 MMHG | BODY MASS INDEX: 27.42 KG/M2 | WEIGHT: 136 LBS | HEART RATE: 72 BPM | OXYGEN SATURATION: 96 %

## 2018-09-18 PROCEDURE — 93000 ELECTROCARDIOGRAM COMPLETE: CPT

## 2018-09-18 PROCEDURE — 99214 OFFICE O/P EST MOD 30 MIN: CPT

## 2018-09-18 RX ORDER — VALSARTAN 40 MG/1
40 TABLET, COATED ORAL DAILY
Qty: 1 | Refills: 1 | Status: DISCONTINUED | COMMUNITY
Start: 2018-08-03 | End: 2018-09-18

## 2018-10-25 ENCOUNTER — APPOINTMENT (OUTPATIENT)
Dept: PULMONOLOGY | Facility: CLINIC | Age: 68
End: 2018-10-25
Payer: MEDICARE

## 2018-10-25 VITALS — SYSTOLIC BLOOD PRESSURE: 160 MMHG | DIASTOLIC BLOOD PRESSURE: 60 MMHG

## 2018-10-25 VITALS
RESPIRATION RATE: 16 BRPM | DIASTOLIC BLOOD PRESSURE: 74 MMHG | HEART RATE: 71 BPM | OXYGEN SATURATION: 98 % | WEIGHT: 132 LBS | HEIGHT: 59 IN | SYSTOLIC BLOOD PRESSURE: 146 MMHG | BODY MASS INDEX: 26.61 KG/M2

## 2018-10-25 VITALS — DIASTOLIC BLOOD PRESSURE: 60 MMHG | SYSTOLIC BLOOD PRESSURE: 150 MMHG

## 2018-10-25 DIAGNOSIS — E03.9 HYPOTHYROIDISM, UNSPECIFIED: ICD-10-CM

## 2018-10-25 LAB — POCT - HEMOGLOBIN (HGB), QUANTITATIVE, TRANSCUTANEOUS: 7.9

## 2018-10-25 PROCEDURE — 99205 OFFICE O/P NEW HI 60 MIN: CPT | Mod: 25

## 2018-10-25 PROCEDURE — 94729 DIFFUSING CAPACITY: CPT

## 2018-10-25 PROCEDURE — 71046 X-RAY EXAM CHEST 2 VIEWS: CPT

## 2018-10-25 PROCEDURE — 88738 HGB QUANT TRANSCUTANEOUS: CPT

## 2018-10-25 PROCEDURE — 94060 EVALUATION OF WHEEZING: CPT

## 2018-10-25 PROCEDURE — 94727 GAS DIL/WSHOT DETER LNG VOL: CPT

## 2018-10-28 ENCOUNTER — RX CHANGE (OUTPATIENT)
Age: 68
End: 2018-10-28

## 2018-10-28 RX ORDER — FOLIC ACID 1 MG/1
1 TABLET ORAL
Qty: 30 | Refills: 0 | Status: ACTIVE | COMMUNITY
Start: 2018-10-28 | End: 1900-01-01

## 2018-10-28 RX ORDER — KETOTIFEN FUMARATE 0.25 MG/ML
0.03 SOLUTION/ DROPS OPHTHALMIC
Qty: 10 | Refills: 0 | Status: ACTIVE | COMMUNITY
Start: 2018-09-11

## 2018-10-28 RX ORDER — LEVOTHYROXINE SODIUM 0.05 MG/1
50 TABLET ORAL
Qty: 90 | Refills: 0 | Status: ACTIVE | COMMUNITY
Start: 2018-06-29

## 2018-10-28 RX ORDER — INSULIN GLARGINE 100 [IU]/ML
100 INJECTION, SOLUTION SUBCUTANEOUS
Refills: 0 | Status: ACTIVE | COMMUNITY
Start: 2018-10-28

## 2018-10-29 ENCOUNTER — RX RENEWAL (OUTPATIENT)
Age: 68
End: 2018-10-29

## 2018-10-30 ENCOUNTER — APPOINTMENT (OUTPATIENT)
Dept: CARDIOLOGY | Facility: CLINIC | Age: 68
End: 2018-10-30
Payer: MEDICARE

## 2018-10-30 VITALS
OXYGEN SATURATION: 97 % | DIASTOLIC BLOOD PRESSURE: 68 MMHG | BODY MASS INDEX: 27.42 KG/M2 | SYSTOLIC BLOOD PRESSURE: 147 MMHG | HEART RATE: 81 BPM | WEIGHT: 136 LBS | HEIGHT: 59 IN

## 2018-10-30 PROCEDURE — 99214 OFFICE O/P EST MOD 30 MIN: CPT

## 2018-11-02 ENCOUNTER — APPOINTMENT (OUTPATIENT)
Dept: PULMONOLOGY | Facility: CLINIC | Age: 68
End: 2018-11-02
Payer: MEDICARE

## 2018-11-02 PROCEDURE — 95800 SLP STDY UNATTENDED: CPT

## 2018-11-04 PROCEDURE — 95800 SLP STDY UNATTENDED: CPT

## 2018-11-05 ENCOUNTER — FORM ENCOUNTER (OUTPATIENT)
Age: 68
End: 2018-11-05

## 2018-11-30 ENCOUNTER — RX RENEWAL (OUTPATIENT)
Age: 68
End: 2018-11-30

## 2018-11-30 ENCOUNTER — NON-APPOINTMENT (OUTPATIENT)
Age: 68
End: 2018-11-30

## 2018-11-30 ENCOUNTER — APPOINTMENT (OUTPATIENT)
Dept: CARDIOLOGY | Facility: CLINIC | Age: 68
End: 2018-11-30
Payer: MEDICARE

## 2018-11-30 VITALS
OXYGEN SATURATION: 97 % | SYSTOLIC BLOOD PRESSURE: 150 MMHG | WEIGHT: 134 LBS | HEART RATE: 77 BPM | BODY MASS INDEX: 27.01 KG/M2 | DIASTOLIC BLOOD PRESSURE: 74 MMHG | HEIGHT: 59 IN

## 2018-11-30 PROCEDURE — 93000 ELECTROCARDIOGRAM COMPLETE: CPT

## 2018-11-30 PROCEDURE — 99214 OFFICE O/P EST MOD 30 MIN: CPT

## 2018-11-30 RX ORDER — CLONIDINE HYDROCHLORIDE 0.2 MG/1
0.2 TABLET ORAL
Qty: 30 | Refills: 0 | Status: DISCONTINUED | COMMUNITY
Start: 2018-01-16 | End: 2018-11-30

## 2018-11-30 NOTE — REVIEW OF SYSTEMS
[Feeling Fatigued] : feeling fatigued [Lower Ext Edema] : lower extremity edema [Negative] : Heme/Lymph [Shortness Of Breath] : no shortness of breath [Dyspnea on exertion] : not dyspnea during exertion [Heartburn] : no heartburn

## 2018-11-30 NOTE — HISTORY OF PRESENT ILLNESS
[FreeTextEntry1] : 68 year old woman with a history of DM, HTN. HLD, anemia, CAD s/p [place] JACQUELINE to OM1 and mRCA secondary to NSTEMI on 9/19/17. \par She was initially seen at [facility] for symptoms of unstable anginal and was found to have a small NSTEMI. Her echo showed normal LV function. \par \par She had a EGD/colonoscopy that showed hemorrhoids, gastritis, gastric ulceration, hiatal hernia. She was placed on carafate and H2 blocker. Her GERD symptoms have improved. She had  received 2 units of  PRBCs. She is seeing the hematologist for iron infusion. \par She had a negative capsule study.\par she had an nuclear stress test on 4/30/18 which showed fair exercise tolerance with a hypertensive response; she had normal perfusion imaging. She had an echo on that was showed normal systolic LV function without any significant other findings, including no significant valvular disease. 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 She was started on Mycophenolate by nephrology at [place]. \par \par She is now here for a followup visit. \par Since her last visit, she was noted to have worsening anemia. She was given 2 units of PRBCs. Since then she has been more HTN and having headaches. She was changed to Torsemide 20mg Qday and her Ibersartan 150mg. \par \par He is complaining of leg aches only at night time. \par \par She maintains 2 pillow orthopnea. She states that dyspnea on exertion has improved after the blood.   Though her exercise tolerance is limited by her fatigue. \par  \par her lightheadedness has improved since readjusting her medication dosing. \par \par Her lower extremity edema has improved after the PRBCs. \par \par Her fatigue has improved. \par \par She  denies any chest pain, PND, near syncope, syncope, strokelike symptoms.  \par She is compliant with her medications.\par

## 2018-11-30 NOTE — DISCUSSION/SUMMARY
[FreeTextEntry1] : 68 year woman with a history as listed presents for a followup visit. \par Andree is relatively stable from a cardiovascular POV. She denies any anginal symptoms.   Her EKG on 8/2018 was unchanged from previous. \par \par She had an nuclear stress test on 4/30/18 which showed fair exercise tolerance with a hypertensive response; she had normal perfusion imaging. She had an echo on that was showed normal systolic LV function without any significant other findings, including no significant valvular disease.\par \par I think that many of her fatigue symptoms are from anemia as they have resolved after the PRBCs. \par  I would suggest to transfuse her if her Hb<7.0 to avoid a high output state and coronary ischemia. Continue with Procrit.    She will continue with Toresemide 20mg Qday PO.  \par \par Her PCI to the RCA and OM was in 9/2017. She is now greater than one year out. Given her anemia, increased risk of bleeding I would stop her Plavix at this time. She will continue ASA 81mg Qday. \par \par I think her fatigue is likely multifactorial from poor sleeping, medications. She is awaiting the results of her sleep study. \par \par Her blood pressure is better controlled overall. Her last blood pressure monitor showed an average BP of 155/85. For her this is acceptable at this time.  She will continue  Coreg 25mg q12.  She will stay off of CCB because of lower extremity edema. She was recently taken off of her Valsartan. She did not tolerate the valtasa. She will continue Ibersartan 150mg Qday.   She will take the Hydralazine 100mg q8. I will continue her Imdur  60mg q12.  \par She is willing to try Clonidine 0.2mg patch  Qweek.  \par \par Her leg pain maybe from her statin. I will have her hold it for 5 days and see if it improves. If so she will need to be switched to another agent. Her goal LDL <70. \par \par Many of her complaints maybe due to her MPGN. She will continue on Cellcept. She will go to rheumatology to rule out autoimmune disease. \par \par Continue b12 oral supplementation. \par \par Exercise and diet counseling was performed in order to reduce her future cardiovascular risk. \par She will followup with me in 4 weeks  or sooner if necessary.

## 2018-11-30 NOTE — PHYSICAL EXAM
[Well Groomed] : well groomed [General Appearance - In No Acute Distress] : no acute distress [Normal Conjunctiva] : the conjunctiva exhibited no abnormalities [Eyelids - No Xanthelasma] : the eyelids demonstrated no xanthelasmas [Normal Oral Mucosa] : normal oral mucosa [No Oral Pallor] : no oral pallor [No Oral Cyanosis] : no oral cyanosis [Normal Jugular Venous A Waves Present] : normal jugular venous A waves present [Normal Jugular Venous V Waves Present] : normal jugular venous V waves present [No Jugular Venous Garcia A Waves] : no jugular venous garcia A waves [Abdomen Soft] : soft [Abdomen Tenderness] : non-tender [Abdomen Mass (___ Cm)] : no abdominal mass palpated [Abnormal Walk] : normal gait [Gait - Sufficient For Exercise Testing] : the gait was sufficient for exercise testing [Nail Clubbing] : no clubbing of the fingernails [Cyanosis, Localized] : no localized cyanosis [Petechial Hemorrhages (___cm)] : no petechial hemorrhages [Skin Color & Pigmentation] : normal skin color and pigmentation [] : no rash [No Venous Stasis] : no venous stasis [Skin Lesions] : no skin lesions [No Skin Ulcers] : no skin ulcer [No Xanthoma] : no  xanthoma was observed [Oriented To Time, Place, And Person] : oriented to person, place, and time [Affect] : the affect was normal [Mood] : the mood was normal [No Anxiety] : not feeling anxious [Normal Rhythm/Effort] : normal respiratory rhythm and effort [Clear Bilaterally] : the lungs were clear to auscultation bilaterally [Normal Rate] : normal [Rhythm Regular] : regular [Normal S1] : normal S1 [Normal S2] : normal S2 [No Gallop] : no gallop heard [No Murmur] : no murmurs heard [I] : a grade 1 [Left Carotid Bruit] : left carotid bruit heard [2+] : left 2+ [___ +] : bilateral [unfilled]U+ pretibial pitting edema [FreeTextEntry1] : appears pale [Right Carotid Bruit] : no bruit heard over the right carotid [Bruit] : no bruit heard

## 2018-12-03 ENCOUNTER — APPOINTMENT (OUTPATIENT)
Dept: PULMONOLOGY | Facility: CLINIC | Age: 68
End: 2018-12-03
Payer: MEDICARE

## 2018-12-03 VITALS
OXYGEN SATURATION: 98 % | HEART RATE: 75 BPM | SYSTOLIC BLOOD PRESSURE: 147 MMHG | DIASTOLIC BLOOD PRESSURE: 73 MMHG | TEMPERATURE: 97.8 F | RESPIRATION RATE: 12 BRPM

## 2018-12-03 PROCEDURE — 99213 OFFICE O/P EST LOW 20 MIN: CPT

## 2018-12-28 ENCOUNTER — RX RENEWAL (OUTPATIENT)
Age: 68
End: 2018-12-28

## 2018-12-28 ENCOUNTER — APPOINTMENT (OUTPATIENT)
Dept: CARDIOLOGY | Facility: CLINIC | Age: 68
End: 2018-12-28
Payer: MEDICARE

## 2018-12-28 VITALS — DIASTOLIC BLOOD PRESSURE: 83 MMHG | SYSTOLIC BLOOD PRESSURE: 189 MMHG

## 2018-12-28 VITALS — DIASTOLIC BLOOD PRESSURE: 88 MMHG | SYSTOLIC BLOOD PRESSURE: 160 MMHG

## 2018-12-28 VITALS
DIASTOLIC BLOOD PRESSURE: 74 MMHG | HEIGHT: 59 IN | BODY MASS INDEX: 27.21 KG/M2 | WEIGHT: 135 LBS | HEART RATE: 77 BPM | SYSTOLIC BLOOD PRESSURE: 206 MMHG | OXYGEN SATURATION: 97 %

## 2018-12-28 PROCEDURE — 99215 OFFICE O/P EST HI 40 MIN: CPT

## 2018-12-28 PROCEDURE — 93000 ELECTROCARDIOGRAM COMPLETE: CPT

## 2018-12-28 NOTE — DISCUSSION/SUMMARY
[FreeTextEntry1] : 68 year woman with a history as listed presents for a followup visit. \par Andree is relatively stable from a cardiovascular POV. She denies any anginal symptoms.   Her EKG on 8/2018 was unchanged from previous. \par \par She had an nuclear stress test on 4/30/18 which showed fair exercise tolerance with a hypertensive response; she had normal perfusion imaging. She had an echo on that was showed normal systolic LV function without any significant other findings, including no significant valvular disease.\par \par I think that many of her fatigue symptoms are from anemia as they have resolved after the PRBCs. \par  I would suggest to transfuse her if her Hb<7.0 to avoid a high output state and coronary ischemia. Continue with Procrit.    She will continue with Toresemide 20mg Q12  \par \par Her PCI to the RCA and OM was in 9/2017. She is now greater than one year out. Given her anemia, increased risk of bleeding I would stop her Plavix at this time. She will continue ASA 81mg Qday. \par \par I think her fatigue is likely multifactorial from poor sleeping, medications. She is awaiting the results of her sleep study. \par \par Her blood pressure is better controlled overall. Her last blood pressure monitor showed an average BP of 155/85. For her this is acceptable at this time.  She will continue  Coreg 25mg q12.  She will stay off of CCB because of lower extremity edema. She was recently taken off of her Valsartan. She did not tolerate the valtasa. She will continue Ibersartan 150mg Qday.   She will take the Hydralazine 100mg q8. I will continue her Imdur  60mg q12.  \par \par She will increase Clonidine to 0.3mg patch  Qweek.  \par \par Her leg pain maybe from her statin. I will have her hold it for 5 days and see if it improves. If so she will need to be switched to another agent. Her goal LDL <70. \par \par Many of her complaints maybe due to her MPGN. She will continue on Cellcept. She will go to rheumatology to rule out autoimmune disease. \par \par Continue b12 oral supplementation. \par \par Exercise and diet counseling was performed in order to reduce her future cardiovascular risk. \par She will followup with me in after her trip to [facility].

## 2018-12-28 NOTE — HISTORY OF PRESENT ILLNESS
[FreeTextEntry1] : 68 year old woman with a history of DM, HTN. HLD, anemia, CAD s/p [place] JACQUELINE to OM1 and mRCA secondary to NSTEMI on 9/19/17. \par She was initially seen at [facility] for symptoms of unstable anginal and was found to have a small NSTEMI. Her echo showed normal LV function. \par \par She had a EGD/colonoscopy that showed hemorrhoids, gastritis, gastric ulceration, hiatal hernia. She was placed on carafate and H2 blocker. Her GERD symptoms have improved. She had  received 2 units of  PRBCs. She is seeing the hematologist for iron infusion. \par She had a negative capsule study.\par she had an nuclear stress test on 4/30/18 which showed fair exercise tolerance with a hypertensive response; she had normal perfusion imaging. She had an echo on that was showed normal systolic LV function without any significant other findings, including no significant valvular disease. 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 She was started on Mycophenolate by nephrology at [place]. \par \par She is now here for a followup visit. \par Since her last visit, she has been complaining of increase fatigue in the setting worsening anemia. \par \par She is having difficult sleeping. She maintains 2-3 pillow orthopnea.  her dyspnea on exertion has improved.  She has noted increased lower extremity edema recently and her Torsemide was increased to 20mg q12 which improved. \par her lightheadedness has improved since readjusting her medication dosing. \par Her leg aches have improved off of her statin. \par She  denies any chest pain, PND, near syncope, syncope, strokelike symptoms.  \par She is compliant with her medications.\par   \par

## 2018-12-28 NOTE — PHYSICAL EXAM
[Well Groomed] : well groomed [General Appearance - In No Acute Distress] : no acute distress [FreeTextEntry1] : appears pale [Normal Conjunctiva] : the conjunctiva exhibited no abnormalities [Eyelids - No Xanthelasma] : the eyelids demonstrated no xanthelasmas [Normal Oral Mucosa] : normal oral mucosa [No Oral Pallor] : no oral pallor [No Oral Cyanosis] : no oral cyanosis [Normal Jugular Venous A Waves Present] : normal jugular venous A waves present [Normal Jugular Venous V Waves Present] : normal jugular venous V waves present [No Jugular Venous Garcia A Waves] : no jugular venous garcia A waves [Abdomen Soft] : soft [Abdomen Tenderness] : non-tender [Abdomen Mass (___ Cm)] : no abdominal mass palpated [Abnormal Walk] : normal gait [Gait - Sufficient For Exercise Testing] : the gait was sufficient for exercise testing [Nail Clubbing] : no clubbing of the fingernails [Cyanosis, Localized] : no localized cyanosis [Petechial Hemorrhages (___cm)] : no petechial hemorrhages [Skin Color & Pigmentation] : normal skin color and pigmentation [] : no rash [No Venous Stasis] : no venous stasis [Skin Lesions] : no skin lesions [No Skin Ulcers] : no skin ulcer [No Xanthoma] : no  xanthoma was observed [Oriented To Time, Place, And Person] : oriented to person, place, and time [Affect] : the affect was normal [Mood] : the mood was normal [No Anxiety] : not feeling anxious [Normal Rhythm/Effort] : normal respiratory rhythm and effort [Clear Bilaterally] : the lungs were clear to auscultation bilaterally [Normal Rate] : normal [Rhythm Regular] : regular [Normal S1] : normal S1 [Normal S2] : normal S2 [No Gallop] : no gallop heard [No Murmur] : no murmurs heard [I] : a grade 1 [Right Carotid Bruit] : no bruit heard over the right carotid [Left Carotid Bruit] : left carotid bruit heard [2+] : left 2+ [Bruit] : no bruit heard [___ +] : bilateral [unfilled]U+ pretibial pitting edema

## 2018-12-28 NOTE — REVIEW OF SYSTEMS
Team Care Coordination Note    Date:  3/1/2017    Members Present: Aundrea Sanchez, Adam Hutchins, Jeanette Singh,Christopher Blanchard, Aye Huggins RN     Reason for review: To discuss barriers to care, non-compliance or other issues    Discussion:  Pt has poor insight into some of her abilities. She has seen Gustabo MILLARD twice and PT 3 times.     Progress: No change    Plan:  Continue with current plan. Focus on her pain goals and what she wants to do differntly.     Aye Huggins RN, [facility]  Pain Clinic Care Coordinator          [Feeling Fatigued] : feeling fatigued [Shortness Of Breath] : no shortness of breath [Dyspnea on exertion] : not dyspnea during exertion [Lower Ext Edema] : no extremity edema [Heartburn] : no heartburn [Negative] : Heme/Lymph

## 2019-02-25 ENCOUNTER — NON-APPOINTMENT (OUTPATIENT)
Age: 69
End: 2019-02-25

## 2019-02-25 ENCOUNTER — APPOINTMENT (OUTPATIENT)
Dept: CARDIOLOGY | Facility: CLINIC | Age: 69
End: 2019-02-25
Payer: MEDICARE

## 2019-02-25 ENCOUNTER — RX RENEWAL (OUTPATIENT)
Age: 69
End: 2019-02-25

## 2019-02-25 VITALS
HEART RATE: 79 BPM | WEIGHT: 135 LBS | HEIGHT: 59 IN | DIASTOLIC BLOOD PRESSURE: 71 MMHG | OXYGEN SATURATION: 96 % | SYSTOLIC BLOOD PRESSURE: 170 MMHG | BODY MASS INDEX: 27.21 KG/M2

## 2019-02-25 VITALS — DIASTOLIC BLOOD PRESSURE: 60 MMHG | SYSTOLIC BLOOD PRESSURE: 142 MMHG

## 2019-02-25 DIAGNOSIS — R09.82 POSTNASAL DRIP: ICD-10-CM

## 2019-02-25 PROCEDURE — 99214 OFFICE O/P EST MOD 30 MIN: CPT

## 2019-02-25 PROCEDURE — 93000 ELECTROCARDIOGRAM COMPLETE: CPT

## 2019-02-25 RX ORDER — IPRATROPIUM BROMIDE 42 UG/1
0.06 SPRAY NASAL
Qty: 1 | Refills: 5 | Status: ACTIVE | COMMUNITY
Start: 2019-02-25 | End: 1900-01-01

## 2019-02-25 NOTE — HISTORY OF PRESENT ILLNESS
[FreeTextEntry1] : 68 year old woman with a history of DM, HTN. HLD, anemia, CAD s/p [place] JACQUELINE to OM1 and mRCA secondary to NSTEMI on 9/19/17. \par She was initially seen at [facility] for symptoms of unstable anginal and was found to have a small NSTEMI. Her echo showed normal LV function. \par \par She had a EGD/colonoscopy that showed hemorrhoids, gastritis, gastric ulceration, hiatal hernia. She was placed on carafate and H2 blocker. Her GERD symptoms have improved. She is seeing the hematologist for iron infusion. \par She had a negative capsule study.\par she had an nuclear stress test on 4/30/18 which showed fair exercise tolerance with a hypertensive response; she had normal perfusion imaging. She had an echo on that was showed normal systolic LV function without any significant other findings, including no significant valvular disease. 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 She was started on Mycophenolate by nephrology at [place]. \par \par She is now here for a followup visit. \par Since her last visit, she went to [facility] without any issues. \par \par she recently has been seeing a rheumatologist. She was advised that the Cellcept is not working and to consider starting on Rituximab.\par \par She is still complaining of fatigue. 2 weeks ago she reports her Hb was ~10. She feels that the coreg and Imdur is causing the tiredness. \par Her lower extremity edema has improved with Torsemide 40mg Qday. \par She has been complaining of a more post nasal drip. \par \par Her leg aches have improved off of her statin. \par She  denies any chest pain, PND, near syncope, syncope, strokelike symptoms.  \par She is compliant with her medications.\par   \par

## 2019-02-25 NOTE — DISCUSSION/SUMMARY
[FreeTextEntry1] : 68 year woman with a history as listed presents for a followup visit. \par Andree is relatively stable from a cardiovascular POV. She denies any anginal symptoms.   Her EKG on 8/2018 was unchanged from previous. \par \par She had an nuclear stress test on 4/30/18 which showed fair exercise tolerance with a hypertensive response; she had normal perfusion imaging. She had an echo on that was showed normal systolic LV function without any significant other findings, including no significant valvular disease.\par \par Continue with Procrit.    She will continue with Torsemide 40mg Qday\par Her PCI to the RCA and OM was in 9/2017. She is now greater than one year out. Given her anemia, increased risk of bleeding I would stop her Plavix at this time. She will continue ASA 81mg Qday. \par \par I think her fatigue is likely multifactorial. I think that many of her fatigue symptoms are from anemia as they have resolved after the PRBCs.  I would suggest to transfuse her if her Hb<7.0 to avoid a high output state and coronary ischemia. She is going to have her labs checked this week with hematology. \par \par Her blood pressure is better controlled overall. Though she feels that her Imdur and Coreg are contributing to her fatigue. Though she does not want to adjust her medications if she has to take more pills which would be the case.  Her last blood pressure monitor showed an average BP of 155/85. For her this is acceptable at this time.  She will stay off of CCB because of lower extremity edema.\par  She was recently taken off of her Valsartan. She did not tolerate the valtasa. She will continue Ibersartan 150mg Qday.   She will take the Hydralazine 100mg q8. I will continue her Imdur  60mg q12.   She will continue  Coreg 25mg q12. \par \par She will continue with the Clonidine to 0.3mg patch  Qweek.  \par \par Her leg pain maybe from her statin. I will have her hold it for 5 days and see if it improves. If so she will need to be switched to another agent. Her goal LDL <70. \par \par Many of her complaints maybe due to her MPGN. She will continue on Cellcept for now. Though I agree Rituximab may be a better choice has the Cellcept has not produced a dramatic improvement. \par \par Continue b12 oral supplementation. \par \par Exercise and diet counseling was performed in order to reduce her future cardiovascular risk. \par She will followup with me in April

## 2019-02-25 NOTE — REVIEW OF SYSTEMS
[Feeling Fatigued] : feeling fatigued [Shortness Of Breath] : no shortness of breath [Dyspnea on exertion] : not dyspnea during exertion [Lower Ext Edema] : no extremity edema [Heartburn] : no heartburn [Negative] : Heme/Lymph

## 2019-03-12 ENCOUNTER — RX RENEWAL (OUTPATIENT)
Age: 69
End: 2019-03-12

## 2019-04-09 ENCOUNTER — NON-APPOINTMENT (OUTPATIENT)
Age: 69
End: 2019-04-09

## 2019-04-09 ENCOUNTER — APPOINTMENT (OUTPATIENT)
Dept: CARDIOLOGY | Facility: CLINIC | Age: 69
End: 2019-04-09
Payer: MEDICARE

## 2019-04-09 VITALS
SYSTOLIC BLOOD PRESSURE: 177 MMHG | DIASTOLIC BLOOD PRESSURE: 69 MMHG | BODY MASS INDEX: 26.41 KG/M2 | WEIGHT: 131 LBS | HEIGHT: 59 IN | OXYGEN SATURATION: 98 % | HEART RATE: 61 BPM

## 2019-04-09 PROCEDURE — 99214 OFFICE O/P EST MOD 30 MIN: CPT

## 2019-04-09 PROCEDURE — 93000 ELECTROCARDIOGRAM COMPLETE: CPT

## 2019-04-09 RX ORDER — FUROSEMIDE 40 MG/1
40 TABLET ORAL DAILY
Qty: 90 | Refills: 3 | Status: DISCONTINUED | COMMUNITY
Start: 2018-04-10 | End: 2019-04-09

## 2019-04-09 RX ORDER — CLOPIDOGREL BISULFATE 75 MG/1
75 TABLET, FILM COATED ORAL DAILY
Qty: 90 | Refills: 1 | Status: DISCONTINUED | COMMUNITY
Start: 2017-09-20 | End: 2019-04-09

## 2019-04-09 RX ORDER — IRBESARTAN 150 MG/1
150 TABLET ORAL
Qty: 30 | Refills: 0 | Status: DISCONTINUED | COMMUNITY
Start: 2018-08-06 | End: 2019-04-09

## 2019-04-09 RX ORDER — ATORVASTATIN CALCIUM 80 MG/1
80 TABLET, FILM COATED ORAL
Qty: 90 | Refills: 3 | Status: DISCONTINUED | COMMUNITY
Start: 2017-09-20 | End: 2019-04-09

## 2019-04-09 NOTE — HISTORY OF PRESENT ILLNESS
[FreeTextEntry1] : 68 year old woman with a history of DM, HTN. HLD, anemia, CAD s/p [place] JACQUELINE to OM1 and mRCA secondary to NSTEMI on 9/19/17. \par She was initially seen at [facility] for symptoms of unstable anginal and was found to have a small NSTEMI. Her echo showed normal LV function. \par \par She had a EGD/colonoscopy that showed hemorrhoids, gastritis, gastric ulceration, hiatal hernia. She was placed on carafate and H2 blocker. Her GERD symptoms have improved. She is seeing the hematologist for iron infusion. \par She had a negative capsule study.\par she had an nuclear stress test on 4/30/18 which showed fair exercise tolerance with a hypertensive response; she had normal perfusion imaging. She had an echo on that was showed normal systolic LV function without any significant other findings, including no significant valvular disease. 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 She was started on Mycophenolate by nephrology at [place].  She was advised that the Cellcept is not working and  started on Rituximab.\par   \par \par She is now here for a followup visit. \par Since her last visit, she feeling much better on the Rituximab for 2 dose. She is feeling much better off the Cellcept. She lost about 10 pounds. Her edema has dramatically improved. \par She got a second opinion from Dr. 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 \par her fatigue has improved.  \par She  denies any chest pain, PND, near syncope, syncope, strokelike symptoms.  She is still complaining of mild dyspnea on exertion which is chronic but improving. \par She is compliant with her medications. She had been taken off of Ibersartan for hyperkalemia \par   \par

## 2019-04-09 NOTE — DISCUSSION/SUMMARY
[FreeTextEntry1] : 68 year woman with a history as listed presents for a followup visit. \par Andree is relatively stable from a cardiovascular POV. She denies any anginal symptoms. \par \par She had an nuclear stress test on 4/30/18 which showed fair exercise tolerance with a hypertensive response; she had normal perfusion imaging. She had an echo on that was showed normal systolic LV function without any significant other findings, including no significant valvular disease.\par \par \par Her PCI to the RCA and OM was in 9/2017. She is now greater than one year out. Given her anemia, increased risk of bleeding I would stop her Plavix at this time. She will continue ASA 81mg Qday. \par \par Her anemia has improved. She will continue with hematology and get PRBCs as needed. Continue folate and b12 oral supplementation. Continue with Procrit.\par \par Her blood pressure is better controlled overall.   Her last blood pressure monitor showed an average BP of 155/85. For her this is acceptable at this time. \par She did not tolerate the valtasa. She will take the Hydralazine 100mg q8. I will continue her Imdur  60mg q12.   She will continue  Coreg 25mg q12. She has not tolerated the ARBs because of hyperkalemia. \par She will continue with the Clonidine to 0.3mg patch  Qweek.  I suggest that she take Procardia 30mg Qday but she does not take anymore medications at this time. CCBs were previously thought to be a cause f her edema but likely this was all from her underlying renal dysfunction.  \par She is euvolemic on exam.  She will continue with Torsemide 40mg Qday\par \par She is tolerating Crestor 10mg HS. Her lipids are relatively at goal (87 2/27/19). Her goal LDL <70. \par \par She will continue with Rituximab per rheum and renal. \par \par \par \par Exercise and diet counseling was performed in order to reduce her future cardiovascular risk. \par She will followup with me in end of June

## 2019-04-09 NOTE — PHYSICAL EXAM
[Well Groomed] : well groomed [General Appearance - In No Acute Distress] : no acute distress [Normal Conjunctiva] : the conjunctiva exhibited no abnormalities [Eyelids - No Xanthelasma] : the eyelids demonstrated no xanthelasmas [Normal Oral Mucosa] : normal oral mucosa [No Oral Pallor] : no oral pallor [No Oral Cyanosis] : no oral cyanosis [Normal Jugular Venous A Waves Present] : normal jugular venous A waves present [Normal Jugular Venous V Waves Present] : normal jugular venous V waves present [No Jugular Venous Garcia A Waves] : no jugular venous garcia A waves [Abdomen Soft] : soft [Abdomen Tenderness] : non-tender [Abdomen Mass (___ Cm)] : no abdominal mass palpated [Abnormal Walk] : normal gait [Nail Clubbing] : no clubbing of the fingernails [Gait - Sufficient For Exercise Testing] : the gait was sufficient for exercise testing [Cyanosis, Localized] : no localized cyanosis [Petechial Hemorrhages (___cm)] : no petechial hemorrhages [Skin Color & Pigmentation] : normal skin color and pigmentation [No Venous Stasis] : no venous stasis [] : no rash [Skin Lesions] : no skin lesions [No Skin Ulcers] : no skin ulcer [Oriented To Time, Place, And Person] : oriented to person, place, and time [No Xanthoma] : no  xanthoma was observed [Affect] : the affect was normal [No Anxiety] : not feeling anxious [Mood] : the mood was normal [Normal Rhythm/Effort] : normal respiratory rhythm and effort [Clear Bilaterally] : the lungs were clear to auscultation bilaterally [Normal Rate] : normal [Rhythm Regular] : regular [Normal S1] : normal S1 [Normal S2] : normal S2 [No Gallop] : no gallop heard [I] : a grade 1 [No Murmur] : no murmurs heard [Left Carotid Bruit] : left carotid bruit heard [2+] : right 2+ [___ +] : bilateral [unfilled]U+ pretibial pitting edema [FreeTextEntry1] : appears pale [Right Carotid Bruit] : no bruit heard over the right carotid [Bruit] : no bruit heard

## 2019-04-09 NOTE — REVIEW OF SYSTEMS
[Negative] : Heme/Lymph [Shortness Of Breath] : no shortness of breath [Dyspnea on exertion] : dyspnea during exertion [Lower Ext Edema] : lower extremity edema [Heartburn] : no heartburn

## 2019-06-20 ENCOUNTER — RX RENEWAL (OUTPATIENT)
Age: 69
End: 2019-06-20

## 2019-06-28 ENCOUNTER — APPOINTMENT (OUTPATIENT)
Dept: CARDIOLOGY | Facility: CLINIC | Age: 69
End: 2019-06-28
Payer: MEDICARE

## 2019-06-28 ENCOUNTER — NON-APPOINTMENT (OUTPATIENT)
Age: 69
End: 2019-06-28

## 2019-06-28 VITALS
BODY MASS INDEX: 26.61 KG/M2 | WEIGHT: 132 LBS | HEIGHT: 59 IN | DIASTOLIC BLOOD PRESSURE: 76 MMHG | OXYGEN SATURATION: 96 % | SYSTOLIC BLOOD PRESSURE: 192 MMHG | HEART RATE: 63 BPM

## 2019-06-28 PROCEDURE — 93000 ELECTROCARDIOGRAM COMPLETE: CPT

## 2019-06-28 PROCEDURE — 99214 OFFICE O/P EST MOD 30 MIN: CPT

## 2019-06-28 NOTE — PHYSICAL EXAM
[Well Groomed] : well groomed [General Appearance - In No Acute Distress] : no acute distress [Normal Conjunctiva] : the conjunctiva exhibited no abnormalities [Eyelids - No Xanthelasma] : the eyelids demonstrated no xanthelasmas [No Oral Pallor] : no oral pallor [Normal Oral Mucosa] : normal oral mucosa [No Oral Cyanosis] : no oral cyanosis [No Jugular Venous Garcia A Waves] : no jugular venous garcia A waves [Normal Jugular Venous A Waves Present] : normal jugular venous A waves present [Normal Jugular Venous V Waves Present] : normal jugular venous V waves present [Abdomen Tenderness] : non-tender [Abdomen Soft] : soft [Abdomen Mass (___ Cm)] : no abdominal mass palpated [Abnormal Walk] : normal gait [Gait - Sufficient For Exercise Testing] : the gait was sufficient for exercise testing [Nail Clubbing] : no clubbing of the fingernails [Cyanosis, Localized] : no localized cyanosis [Petechial Hemorrhages (___cm)] : no petechial hemorrhages [Skin Color & Pigmentation] : normal skin color and pigmentation [] : no rash [No Venous Stasis] : no venous stasis [Skin Lesions] : no skin lesions [No Skin Ulcers] : no skin ulcer [No Xanthoma] : no  xanthoma was observed [Oriented To Time, Place, And Person] : oriented to person, place, and time [No Anxiety] : not feeling anxious [Affect] : the affect was normal [Mood] : the mood was normal [Normal Rhythm/Effort] : normal respiratory rhythm and effort [Clear Bilaterally] : the lungs were clear to auscultation bilaterally [Normal Rate] : normal [Rhythm Regular] : regular [Normal S1] : normal S1 [Normal S2] : normal S2 [No Gallop] : no gallop heard [No Murmur] : no murmurs heard [I] : a grade 1 [Left Carotid Bruit] : left carotid bruit heard [2+] : left 2+ [___ +] : bilateral [unfilled]U+ pretibial pitting edema [FreeTextEntry1] : appears pale [Bruit] : no bruit heard [Right Carotid Bruit] : no bruit heard over the right carotid

## 2019-06-28 NOTE — DISCUSSION/SUMMARY
[FreeTextEntry1] : 68 year woman with a history as listed presents for a followup visit. \par Andree is  overall improved but now is complaining of increased fatigue. I think that this is a function of her hypertension that has worsened as a side effect of the Aranesp.  She did not tolerate the valtasa. She will take the Hydralazine 100mg q8. I will continue her Imdur  60mg q12.   She will continue  Coreg 25mg q12. She has not tolerated the ARBs because of hyperkalemia. \par She will continue with the Clonidine to 0.3mg patch  Qweek.  I suggest that she take Procardia 30mg Qday  and reassess her BPs daily. \par \par She denies any anginal symptoms. Clinically she is euvolemic on exam. Her EKG is unchanged from previous. \par Her PCI to the RCA and OM was in 9/2017. She is now greater than one year out. Given her anemia, increased risk of bleeding I would stop her Plavix at this time. She will continue ASA 81mg Qday. \par She had an nuclear stress test on 4/30/18 which showed fair exercise tolerance with a hypertensive response; she had normal perfusion imaging. She had an echo on that was showed normal systolic LV function without any significant other findings, including no significant valvular disease.\par \par Her anemia has improved. She will continue with hematology and get PRBCs as needed. Continue folate and b12 oral supplementation.  She will have her labs checked next week with Rh[facility]. \par   \par She is euvolemic on exam.  She will continue with Torsemide 40mg QOD\par \par She is tolerating Crestor 10mg HS. Her lipids are relatively at goal (87 2/27/19). Her goal LDL <70. \par \par She will continue with Rituximab per rheum, heme, and renal. \par \par Exercise and diet counseling was performed in order to reduce her future cardiovascular risk. \par She will followup with me in  3 months

## 2019-06-28 NOTE — REVIEW OF SYSTEMS
[Negative] : Heme/Lymph [Feeling Fatigued] : feeling fatigued [Shortness Of Breath] : no shortness of breath [Dyspnea on exertion] : not dyspnea during exertion [Heartburn] : no heartburn [Lower Ext Edema] : no extremity edema

## 2019-06-28 NOTE — HISTORY OF PRESENT ILLNESS
[FreeTextEntry1] : 68 year old woman with a history of DM, HTN. HLD, anemia, CAD s/p [place] JACQUELINE to OM1 and mRCA secondary to NSTEMI on 9/19/17. \par She was initially seen at [facility] for symptoms of unstable anginal and was found to have a small NSTEMI. Her echo showed normal LV function. \par \par She had a EGD/colonoscopy that showed hemorrhoids, gastritis, gastric ulceration, hiatal hernia. She was placed on carafate and H2 blocker. Her GERD symptoms have improved. She is seeing the hematologist for iron infusion. \par She had a negative capsule study.\par she had an nuclear stress test on 4/30/18 which showed fair exercise tolerance with a hypertensive response; she had normal perfusion imaging. She had an echo on that was showed normal systolic LV function without any significant other findings, including no significant valvular disease. 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 She was started on Mycophenolate by nephrology at [place].  She was advised that the Cellcept is not working and  started on Rituximab. he feeling much better on the Rituximab for 2 dose. She is feeling much better off the Cellcept. \par She got a second opinion from Dr. 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 \par \par She is now here for a followup visit. \par Since her last visit, she has been recently feeling more fatigued. She states that she recently had an Aranesp injection. She noted that her  hypertension has worsened for this time at one time SBP noted to be 230. She notes that her home BP recent 130-140/60-70s. \par She is not sleeping very well. \par She  denies any chest pain, PND, near syncope, syncope, strokelike symptoms, lower extremity edema.   Her dyspnea on exertion has improved. She is compliant with her medications.  She has been taking Toresemide 40mg QOD.\par   \par

## 2019-09-12 ENCOUNTER — RX RENEWAL (OUTPATIENT)
Age: 69
End: 2019-09-12

## 2019-09-27 ENCOUNTER — NON-APPOINTMENT (OUTPATIENT)
Age: 69
End: 2019-09-27

## 2019-09-27 ENCOUNTER — APPOINTMENT (OUTPATIENT)
Dept: CARDIOLOGY | Facility: CLINIC | Age: 69
End: 2019-09-27
Payer: MEDICARE

## 2019-09-27 VITALS — SYSTOLIC BLOOD PRESSURE: 122 MMHG | DIASTOLIC BLOOD PRESSURE: 68 MMHG

## 2019-09-27 VITALS
SYSTOLIC BLOOD PRESSURE: 130 MMHG | DIASTOLIC BLOOD PRESSURE: 77 MMHG | OXYGEN SATURATION: 96 % | BODY MASS INDEX: 26.61 KG/M2 | HEART RATE: 70 BPM | HEIGHT: 59 IN | WEIGHT: 132 LBS

## 2019-09-27 PROCEDURE — 93000 ELECTROCARDIOGRAM COMPLETE: CPT

## 2019-09-27 PROCEDURE — 99214 OFFICE O/P EST MOD 30 MIN: CPT

## 2019-09-27 NOTE — PHYSICAL EXAM
[Well Groomed] : well groomed [General Appearance - In No Acute Distress] : no acute distress [FreeTextEntry1] : appears pale [Eyelids - No Xanthelasma] : the eyelids demonstrated no xanthelasmas [Normal Conjunctiva] : the conjunctiva exhibited no abnormalities [Normal Oral Mucosa] : normal oral mucosa [No Oral Pallor] : no oral pallor [No Oral Cyanosis] : no oral cyanosis [Normal Jugular Venous A Waves Present] : normal jugular venous A waves present [Normal Jugular Venous V Waves Present] : normal jugular venous V waves present [No Jugular Venous Garcia A Waves] : no jugular venous garcia A waves [Abdomen Soft] : soft [Abdomen Mass (___ Cm)] : no abdominal mass palpated [Abdomen Tenderness] : non-tender [Abnormal Walk] : normal gait [Gait - Sufficient For Exercise Testing] : the gait was sufficient for exercise testing [Nail Clubbing] : no clubbing of the fingernails [Cyanosis, Localized] : no localized cyanosis [Petechial Hemorrhages (___cm)] : no petechial hemorrhages [Skin Color & Pigmentation] : normal skin color and pigmentation [] : no rash [No Venous Stasis] : no venous stasis [Skin Lesions] : no skin lesions [No Skin Ulcers] : no skin ulcer [No Xanthoma] : no  xanthoma was observed [Oriented To Time, Place, And Person] : oriented to person, place, and time [Affect] : the affect was normal [Mood] : the mood was normal [No Anxiety] : not feeling anxious [Normal Rhythm/Effort] : normal respiratory rhythm and effort [Clear Bilaterally] : the lungs were clear to auscultation bilaterally [Rhythm Regular] : regular [Normal Rate] : normal [Normal S1] : normal S1 [Normal S2] : normal S2 [No Murmur] : no murmurs heard [No Gallop] : no gallop heard [I] : a grade 1 [Right Carotid Bruit] : no bruit heard over the right carotid [Left Carotid Bruit] : left carotid bruit heard [2+] : left 2+ [Bruit] : no bruit heard [___ +] : bilateral [unfilled]U+ pretibial pitting edema

## 2019-09-27 NOTE — REVIEW OF SYSTEMS
[Shortness Of Breath] : no shortness of breath [Feeling Fatigued] : feeling fatigued [Heartburn] : no heartburn [Dyspnea on exertion] : not dyspnea during exertion [Lower Ext Edema] : no extremity edema [Negative] : Endocrine

## 2019-09-27 NOTE — HISTORY OF PRESENT ILLNESS
[FreeTextEntry1] : 69 year old woman with a history of DM, HTN. HLD, anemia, CAD s/p [place] JACQUELINE to OM1 and mRCA secondary to NSTEMI on 9/19/17. \par She was initially seen at [facility] for symptoms of unstable anginal and was found to have a small NSTEMI. Her echo showed normal LV function. \par \par She had a EGD/colonoscopy that showed hemorrhoids, gastritis, gastric ulceration, hiatal hernia. She was placed on carafate and H2 blocker. Her GERD symptoms have improved. She is seeing the hematologist for iron infusion. \par She had a negative capsule study.\par she had an nuclear stress test on 4/30/18 which showed fair exercise tolerance with a hypertensive response; she had normal perfusion imaging. She had an echo on that was showed normal systolic LV function without any significant other findings, including no significant valvular disease. 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 She was started on Mycophenolate by nephrology at [place].  She was advised that the Cellcept is not working and  started on Rituximab. he feeling much better on the Rituximab for 2 dose. She is feeling much better off the Cellcept. \par She got a second opinion from Dr. 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 \par \par She is now here for a followup visit. \par \par Since her last visit,  she was note feeling well at the end of June. her blood counts had dropped again which was noted towards the end to July. She needed 1U PRBCs. She then got the second dose of Rituxan in the middle of August. She has been feeling better since. \par \par She is not sleeping very well. She still has intermittent lower extremity edema that worsens with prolonged standing and improves with elevation. She has not been compliant with her compression stockings. \par \par She  denies any chest pain, PND, near syncope, syncope, strokelike symptoms. Her dyspnea on exertion has improved but she still feels it when climbing the stairs. She is doing PT to strengthen her muscles. \par  She is compliant with her medications.  She has been taking Toresemide 40mg QDay since her lower extremity edema had worsened. .\par She intermittently will need to take Kayexalate. \par   \par

## 2019-09-27 NOTE — DISCUSSION/SUMMARY
[FreeTextEntry1] : 69 year woman with a history as listed presents for a followup visit. \par \par Andree is  overall improved but now is complaining of increased fatigue. I think this is likely from deconditioning. \par \par her blood pressure has improved.  She did not tolerate the valtasa. She will take the Hydralazine 100mg q8. I will continue her Imdur  60mg q12.   She will continue  Coreg 25mg q12. She has not tolerated the ARBs because of hyperkalemia. \par She will continue with the Clonidine to 0.3mg patch  Qweek.  I suggest that she take Procardia 30mg Q12.\par \par She denies any anginal symptoms. Clinically she is euvolemic on exam. Her EKG is unchanged from previous. \par Her PCI to the RCA and OM was in 9/2017. She is now greater than one year out. Given her anemia, increased risk of bleeding I would stop her Plavix at this time. She will continue ASA 81mg Qday. \par She had an nuclear stress test on 4/30/18 which showed fair exercise tolerance with a hypertensive response; she had normal perfusion imaging. She had an echo on 4/2018 that was showed normal systolic LV function without any significant other findings, including no significant valvular disease.\par \par Her anemia has improved. She will continue with hematology Rheum and get PRBCs as needed. Continue folate and b12 oral supplementation.   \par   \par She is euvolemic on exam.  She will continue with Torsemide 40mg Qday. her lower extremity edema is likely secondary to venous insufficiency and I have counseled her to wear her compression stockings. \par \par She is tolerating Crestor 10mg HS. Her lipids are relatively at goal (87 2/27/19). Her goal LDL <70. \par \par She will continue with Rituximab per rheum, heme, and renal. \par \par Exercise and diet counseling was performed in order to reduce her future cardiovascular risk. \par She will followup with me in  3 months

## 2019-10-01 ENCOUNTER — RX RENEWAL (OUTPATIENT)
Age: 69
End: 2019-10-01

## 2019-12-18 ENCOUNTER — RX RENEWAL (OUTPATIENT)
Age: 69
End: 2019-12-18

## 2019-12-20 ENCOUNTER — APPOINTMENT (OUTPATIENT)
Dept: CARDIOLOGY | Facility: CLINIC | Age: 69
End: 2019-12-20
Payer: MEDICARE

## 2019-12-20 ENCOUNTER — NON-APPOINTMENT (OUTPATIENT)
Age: 69
End: 2019-12-20

## 2019-12-20 VITALS
OXYGEN SATURATION: 96 % | HEART RATE: 79 BPM | BODY MASS INDEX: 25.4 KG/M2 | WEIGHT: 126 LBS | SYSTOLIC BLOOD PRESSURE: 139 MMHG | HEIGHT: 59 IN | DIASTOLIC BLOOD PRESSURE: 67 MMHG

## 2019-12-20 VITALS — SYSTOLIC BLOOD PRESSURE: 128 MMHG | DIASTOLIC BLOOD PRESSURE: 68 MMHG

## 2019-12-20 PROCEDURE — 93000 ELECTROCARDIOGRAM COMPLETE: CPT

## 2019-12-20 PROCEDURE — 99214 OFFICE O/P EST MOD 30 MIN: CPT

## 2019-12-20 NOTE — HISTORY OF PRESENT ILLNESS
[FreeTextEntry1] : 69 year old woman with a history of DM, HTN. HLD, anemia, CAD s/p [place] JACQUELINE to OM1 and mRCA secondary to NSTEMI on 9/19/17. \par She was initially seen at [facility] for symptoms of unstable anginal and was found to have a small NSTEMI. Her echo showed normal LV function. \par \par She had a EGD/colonoscopy that showed hemorrhoids, gastritis, gastric ulceration, hiatal hernia. She was placed on carafate and H2 blocker. Her GERD symptoms have improved. She is seeing the hematologist for iron infusion. \par She had a negative capsule study.\par she had an nuclear stress test on 4/30/18 which showed fair exercise tolerance with a hypertensive response; she had normal perfusion imaging. She had an echo on that was showed normal systolic LV function without any significant other findings, including no significant valvular disease. 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 She was started on Mycophenolate by nephrology at [place].  She was advised that the Cellcept is not working and  started on Rituximab. he feeling much better on the Rituximab for 2 dose. She is feeling much better off the Cellcept. \par She got a second opinion from Dr. 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 \par \par She is now here for a followup visit. \par \par Since her last visit,   she has been feeling well. Her dyspnea on exertion has improved. Her exercise tolerance is now really limited by arthritic pain. She completed PT. \par \par Her lower extremity edema has resolved even without compression stockings. She  denies any chest pain, PND, near syncope, syncope, strokelike symptoms.  \par  She is compliant with her medications.  She has been taking Torsemide 40mg QDay since her lower extremity edema had worsened. .\par She intermittently will need to take Kayexalate. \par   \par

## 2019-12-20 NOTE — DISCUSSION/SUMMARY
[FreeTextEntry1] : 69 year woman with a history as listed presents for a followup visit. \par \par Andree is doing well. She denies any anginal symptoms. Clinically she is euvolemic on exam. Her EKG did not reveal any significant ischemic changes. Her PCI to the RCA and OM was in 9/2017. She is now greater than one year out. Given her anemia, increased risk of bleeding I will keep her off of Plavix. She will continue ASA 81mg Qday. \par \par She had an nuclear stress test on 4/30/18 which showed fair exercise tolerance with a hypertensive response; she had normal perfusion imaging. She had an echo on 4/2018 that was showed normal systolic LV function without any significant other findings, including no significant valvular disease.\par \par her blood pressure has normalized. She will take the Hydralazine 100mg q8. I will continue her Imdur  60mg q12, Coreg 25mg q12, Procardia 30mg Q12. She will continue with the Clonidine to 0.3mg patch  Qweek.She has not tolerated the ARBs because of hyperkalemia.  She did not tolerate the valtasa. \par  \par Her anemia has improved. She will continue with hematology Rheum and get PRBCs as needed. She has completed Rituxan therapy. Continue folate and b12 oral supplementation.   \par   \par She is euvolemic on exam.  She will continue with Torsemide 40mg Qday. her lower extremity edema has resolved. \par \par She is tolerating Crestor 10mg HS. Her lipids are relatively at goal (87 2/27/19). Her goal LDL <70. \par \par Exercise and diet counseling was performed in order to reduce her future cardiovascular risk. \par She will followup with me in  3 months

## 2019-12-20 NOTE — REVIEW OF SYSTEMS
[Feeling Fatigued] : feeling fatigued [Shortness Of Breath] : no shortness of breath [Lower Ext Edema] : no extremity edema [Dyspnea on exertion] : not dyspnea during exertion [Heartburn] : no heartburn [Negative] : Heme/Lymph

## 2019-12-20 NOTE — PHYSICAL EXAM
[Well Groomed] : well groomed [General Appearance - In No Acute Distress] : no acute distress [FreeTextEntry1] : appears pale [Normal Conjunctiva] : the conjunctiva exhibited no abnormalities [Normal Oral Mucosa] : normal oral mucosa [Eyelids - No Xanthelasma] : the eyelids demonstrated no xanthelasmas [No Oral Cyanosis] : no oral cyanosis [No Oral Pallor] : no oral pallor [Normal Jugular Venous V Waves Present] : normal jugular venous V waves present [Normal Jugular Venous A Waves Present] : normal jugular venous A waves present [Abdomen Soft] : soft [No Jugular Venous Garcia A Waves] : no jugular venous garcia A waves [Abdomen Tenderness] : non-tender [Abdomen Mass (___ Cm)] : no abdominal mass palpated [Gait - Sufficient For Exercise Testing] : the gait was sufficient for exercise testing [Abnormal Walk] : normal gait [Nail Clubbing] : no clubbing of the fingernails [Cyanosis, Localized] : no localized cyanosis [Petechial Hemorrhages (___cm)] : no petechial hemorrhages [Skin Color & Pigmentation] : normal skin color and pigmentation [] : no rash [Skin Lesions] : no skin lesions [No Venous Stasis] : no venous stasis [No Skin Ulcers] : no skin ulcer [No Xanthoma] : no  xanthoma was observed [Oriented To Time, Place, And Person] : oriented to person, place, and time [Affect] : the affect was normal [Mood] : the mood was normal [No Anxiety] : not feeling anxious [Clear Bilaterally] : the lungs were clear to auscultation bilaterally [Normal Rhythm/Effort] : normal respiratory rhythm and effort [Normal Rate] : normal [Rhythm Regular] : regular [Normal S1] : normal S1 [Normal S2] : normal S2 [No Murmur] : no murmurs heard [I] : a grade 1 [No Gallop] : no gallop heard [Right Carotid Bruit] : no bruit heard over the right carotid [Left Carotid Bruit] : left carotid bruit heard [2+] : left 2+ [Bruit] : no bruit heard [___ +] : bilateral [unfilled]U+ pretibial pitting edema

## 2020-03-06 ENCOUNTER — RX RENEWAL (OUTPATIENT)
Age: 70
End: 2020-03-06

## 2020-03-09 NOTE — CHART NOTE - NSCHARTNOTESELECT_GEN_ALL_CORE
-- DO NOT REPLY / DO NOT REPLY ALL --  -- Message is from the [facility]--    Patient is requesting a medication refill - medication is on active list, but patient is requesting a change to the medication prescription    Change requested: pharmacy     RX Name and Dose:   amoxicillin (AMOXIL) 250 MG/5ML suspension  triamcinolone (ARISTOCORT) 0.1 % cream  Duration: 30 days    Pharmacy  [id number] - [place], [place] - [address] [facility]    Patient confirmed the above pharmacy as correct?  Yes    Caller Information       Type Contact Phone    03/09/2020 04:07 PM Phone (Incoming) Lisa Wood (Self) [phone] (M)          Alternative phone number: none     Turnaround time given to caller:   \"Your doctor's office is closed. This message will be sent to our nurse. They will return your call as soon as they review your message. (Calls after 10 PM will be returned tomorrow morning.)\"  
Informed patient medications were sent to [facility] today and that she can transfer to the pharmacy of her choice, patient has to use [facility] with [facility].   
Event Note

## 2020-05-05 NOTE — ED ADULT TRIAGE NOTE - RESPIRATORY RATE (BREATHS/MIN)
Physician Discharge Summary     Bebeto Guevara  [id number]    Admit date: 4/30/2020    Discharge date and time: No discharge date for patient encounter. Admitting Physician: Lily Cassidy DO     Admission Diagnoses:   Patient Active Problem List   Diagnosis    Chronic cholecystitis due to cholelithiasis with choledocholithiasis    Cholecystitis    Gallstone pancreatitis       Discharge Diagnoses:   Patient Active Problem List   Diagnosis    Chronic cholecystitis due to cholelithiasis with choledocholithiasis    Cholecystitis    Gallstone pancreatitis         Hospital Course: Bebeto Guevara is a 35 y.o. male who presented for evaluation of choledocholithiasis. He presented with abdominal pain and hyperbilirubinemia and pancreatitis. He underwent ERCP on 5/1 and had post ERCP pancreatitis. He ended up getting a lap sin on 5/4 and post op he did well and pain was well controlled. He was tolerating a low fat diet and was able to be discharged home in stable condition.   Lab Results   Component Value Date    WBC 12.9 05/05/2020    HGB 9.9 05/05/2020     05/05/2020     05/05/2020     05/05/2020    K 4.0 05/05/2020    K 4.0 05/05/2020    BUN 11 05/05/2020    CREATININE 0.8 05/05/2020    GLUCOSE 135 05/05/2020    LABGLOM >60 05/05/2020    PROTIME 10.3 05/01/2020    INR 0.9 05/01/2020    LABALBU 2.8 05/05/2020    PROT 6.0 05/05/2020    CALCIUM 8.6 05/05/2020    BILITOT 0.4 05/05/2020    BILIDIR 0.3 05/05/2020    ALKPHOS 74 05/05/2020    AST 26 05/05/2020    ALT 85 05/05/2020       Discharge Exam:   VITALS: BP (!) 142/77   Pulse 76   Temp 99.7 °F (37.6 °C) (Oral)   Resp 16   Ht 5' 11\" (1.803 m)   Wt 255 lb (115.7 kg)   SpO2 98%   BMI 35.57 kg/m²     GENERAL:  Laying in bed, no apparent distress  LUNGS:  No increased work of breathing, no cyanosis, no wheezing CARDIOVASCULAR:  Extremities warm and well perfused, regular rate  ABDOMEN:  Soft, mild tenderness along the incision site,
16

## 2020-06-11 ENCOUNTER — APPOINTMENT (OUTPATIENT)
Dept: CARDIOLOGY | Facility: CLINIC | Age: 70
End: 2020-06-11
Payer: MEDICARE

## 2020-06-11 ENCOUNTER — NON-APPOINTMENT (OUTPATIENT)
Age: 70
End: 2020-06-11

## 2020-06-11 VITALS
WEIGHT: 125 LBS | SYSTOLIC BLOOD PRESSURE: 124 MMHG | DIASTOLIC BLOOD PRESSURE: 65 MMHG | HEART RATE: 74 BPM | BODY MASS INDEX: 25.2 KG/M2 | HEIGHT: 59 IN | OXYGEN SATURATION: 99 %

## 2020-06-11 DIAGNOSIS — R60.9 EDEMA, UNSPECIFIED: ICD-10-CM

## 2020-06-11 PROCEDURE — 93000 ELECTROCARDIOGRAM COMPLETE: CPT

## 2020-06-11 PROCEDURE — 99214 OFFICE O/P EST MOD 30 MIN: CPT

## 2020-06-11 RX ORDER — ALLOPURINOL 100 MG/1
100 TABLET ORAL
Refills: 0 | Status: ACTIVE | COMMUNITY
Start: 2020-06-11

## 2020-06-11 RX ORDER — DEXLANSOPRAZOLE 30 MG/1
30 CAPSULE, DELAYED RELEASE ORAL DAILY
Qty: 30 | Refills: 5 | Status: DISCONTINUED | COMMUNITY
Start: 2018-03-06 | End: 2020-06-11

## 2020-06-11 NOTE — REVIEW OF SYSTEMS
[Feeling Fatigued] : feeling fatigued [Shortness Of Breath] : no shortness of breath [Lower Ext Edema] : lower extremity edema [Dyspnea on exertion] : dyspnea during exertion [Heartburn] : no heartburn [Negative] : Heme/Lymph

## 2020-06-11 NOTE — HISTORY OF PRESENT ILLNESS
[FreeTextEntry1] : 69 year old woman with a history of DM, HTN. HLD, anemia, CAD s/p [place] JACQUELINE to OM1 and mRCA secondary to NSTEMI on 9/19/17. \par She was initially seen at [facility] for symptoms of unstable anginal and was found to have a small NSTEMI. Her echo showed normal LV function. \par \par She had a EGD/colonoscopy that showed hemorrhoids, gastritis, gastric ulceration, hiatal hernia. She was placed on carafate and H2 blocker. Her GERD symptoms have improved. She is seeing the hematologist for iron infusion. \par She had a negative capsule study.\par she had an nuclear stress test on 4/30/18 which showed fair exercise tolerance with a hypertensive response; she had normal perfusion imaging. She had an echo on that was showed normal systolic LV function without any significant other findings, including no significant valvular disease. 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 She was started on Mycophenolate by nephrology at [place].  She was advised that the Cellcept is not working and  started on Rituximab. he feeling much better on the Rituximab for 2 dose. She is feeling much better off the Cellcept. \par She got a second opinion from Dr. Óscar Ibanez ([facility]) who thought she may have hemolytic anemia and was started on folic acid and Aranesp. \par \par She is now here for a followup visit. \par \par Since her last visit, She has been appropriately quarantined in light of the COVID pandemic.  she has been feeling more tired for the last month. Her Hb was 9.1. She received another dose of Aranesp. She is still feeling tired overall. During the last month she has noted more lower extremity edema and dyspnea on exertion especially going up the stairs. She has not been wearing the compression stockings. \par She  denies any chest pain, PND, near syncope, syncope, strokelike symptoms.  \par She is compliant with her medications.   \par   \par

## 2020-06-11 NOTE — PHYSICAL EXAM
[Well Groomed] : well groomed [General Appearance - In No Acute Distress] : no acute distress [Eyelids - No Xanthelasma] : the eyelids demonstrated no xanthelasmas [FreeTextEntry1] : appears pale [Normal Conjunctiva] : the conjunctiva exhibited no abnormalities [No Oral Pallor] : no oral pallor [Normal Oral Mucosa] : normal oral mucosa [Normal Jugular Venous A Waves Present] : normal jugular venous A waves present [Normal Jugular Venous V Waves Present] : normal jugular venous V waves present [No Oral Cyanosis] : no oral cyanosis [No Jugular Venous Garcia A Waves] : no jugular venous garcia A waves [Abdomen Tenderness] : non-tender [Abdomen Soft] : soft [Abnormal Walk] : normal gait [Abdomen Mass (___ Cm)] : no abdominal mass palpated [Gait - Sufficient For Exercise Testing] : the gait was sufficient for exercise testing [Cyanosis, Localized] : no localized cyanosis [Nail Clubbing] : no clubbing of the fingernails [] : no ischemic changes [Petechial Hemorrhages (___cm)] : no petechial hemorrhages [Skin Color & Pigmentation] : normal skin color and pigmentation [Skin Lesions] : no skin lesions [No Skin Ulcers] : no skin ulcer [No Venous Stasis] : no venous stasis [No Xanthoma] : no  xanthoma was observed [Oriented To Time, Place, And Person] : oriented to person, place, and time [Affect] : the affect was normal [No Anxiety] : not feeling anxious [Mood] : the mood was normal [Normal Rhythm/Effort] : normal respiratory rhythm and effort [Clear Bilaterally] : the lungs were clear to auscultation bilaterally [Rhythm Regular] : regular [Normal Rate] : normal [Normal S1] : normal S1 [Normal S2] : normal S2 [No Gallop] : no gallop heard [I] : a grade 1 [No Murmur] : no murmurs heard [Left Carotid Bruit] : left carotid bruit heard [Right Carotid Bruit] : no bruit heard over the right carotid [Bruit] : no bruit heard [2+] : left 2+ [___ +] : bilateral [unfilled]U+ pretibial pitting edema

## 2020-06-11 NOTE — DISCUSSION/SUMMARY
[FreeTextEntry1] : 69 year woman with a history as listed presents for a followup visit. \par \par Andree is doing well. She denies any anginal symptoms. Clinically she is euvolemic on exam. Her EKG did not reveal any significant ischemic changes. Her PCI to the RCA and OM was in 9/2017. She is now greater than one year out. Given her anemia, increased risk of bleeding I will keep her off of Plavix. She will continue ASA 81mg Qday. \par \par I don’t think that he current dyspnea on exertion is related to her CAD. She will get a 2d echo to assess for any  new structural heart disease, changes in valvular and ventricular function. She had an nuclear stress test on 4/30/18 which showed fair exercise tolerance with a hypertensive response; she had normal perfusion imaging. \par She will try to take an extra Torsemide 40mg x 2 days and then return to 40mg Qday. \par \par her blood pressure has normalized. She will take the Hydralazine 100mg q8. I will continue her Imdur  60mg q12, Coreg 25mg q12, Procardia 30mg Q12. She will continue with the Clonidine to 0.3mg patch  Qweek.She has not tolerated the ARBs because of hyperkalemia.  She did not tolerate the valtasa. \par  \par Her dyspnea may be from worsening anemia.  She will continue with hematology, Rheum and get PRBCs as needed. She has completed Rituxan therapy. Continue folate and b12 oral supplementation.   \par  \par Her lipids were uncontrolled on 3/2020. She will need to increase the Crestor to 20mg HS.  Her goal LDL <70. \par \par Exercise and diet counseling was performed in order to reduce her future cardiovascular risk. \par She will followup with me in  3 months

## 2020-06-15 ENCOUNTER — APPOINTMENT (OUTPATIENT)
Dept: CARDIOLOGY | Facility: CLINIC | Age: 70
End: 2020-06-15
Payer: MEDICARE

## 2020-06-15 PROCEDURE — 93306 TTE W/DOPPLER COMPLETE: CPT

## 2020-09-08 NOTE — ED ADULT NURSE NOTE - NEURO ASSESSMENT
Patient discharged home  Ambulated out with wife  IV's taken out per protocol  Discharge instructions discussed with wife and patient  ORTIZ drain instructions discussed with both also  No questions or concerns voiced at this time  WDL

## 2020-09-10 ENCOUNTER — NON-APPOINTMENT (OUTPATIENT)
Age: 70
End: 2020-09-10

## 2020-09-10 ENCOUNTER — APPOINTMENT (OUTPATIENT)
Dept: CARDIOLOGY | Facility: CLINIC | Age: 70
End: 2020-09-10
Payer: MEDICARE

## 2020-09-10 VITALS — SYSTOLIC BLOOD PRESSURE: 130 MMHG | DIASTOLIC BLOOD PRESSURE: 50 MMHG

## 2020-09-10 VITALS
HEIGHT: 59 IN | OXYGEN SATURATION: 96 % | WEIGHT: 122 LBS | BODY MASS INDEX: 24.6 KG/M2 | HEART RATE: 87 BPM | DIASTOLIC BLOOD PRESSURE: 74 MMHG | SYSTOLIC BLOOD PRESSURE: 151 MMHG

## 2020-09-10 PROCEDURE — 93000 ELECTROCARDIOGRAM COMPLETE: CPT

## 2020-09-10 PROCEDURE — 99214 OFFICE O/P EST MOD 30 MIN: CPT

## 2020-09-10 NOTE — HISTORY OF PRESENT ILLNESS
[FreeTextEntry1] : 70 year old woman with a history of DM, HTN. HLD, anemia, CAD s/p [place] JACQUELINE to OM1 and mRCA secondary to NSTEMI on 9/19/17. \par She was initially seen at [facility] for symptoms of unstable anginal and was found to have a small NSTEMI. Her echo showed normal LV function. \par \par She had a EGD/colonoscopy that showed hemorrhoids, gastritis, gastric ulceration, hiatal hernia. She was placed on carafate and H2 blocker. Her GERD symptoms have improved. She is seeing the hematologist for iron infusion. \par She had a negative capsule study.\par she had an nuclear stress test on 4/30/18 which showed fair exercise tolerance with a hypertensive response; she had normal perfusion imaging. She had an echo on that was showed normal systolic LV function without any significant other findings, including no significant valvular disease. 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 She was started on Mycophenolate by nephrology at [place].  She was advised that the Cellcept is not working and  started on Rituximab. he feeling much better on the Rituximab for 2 dose. She is feeling much better off the Cellcept. \par She got a second opinion from Dr. Óscar Ibanez ([facility]) who thought she may have hemolytic anemia and was started on folic acid and Aranesp. \par \par She is now here for a followup visit. \par \par Since her last visit, she is complaining of significant fatigue that she associates with her medications. She notes that after taking her Coreg and Hydralazine she is feels extremely tired. She feels it s more the Hydralazine because after her noon dose she feels fatigued. \par Her blood pressure at home are 120s/60s.  \par She  denies any chest pain, PND, near syncope, syncope, strokelike symptoms,lower extremity edema.  \par She is compliant with her medications.   \par   \par

## 2020-09-10 NOTE — DISCUSSION/SUMMARY
[FreeTextEntry1] : 70 year woman with a history as listed presents for a followup visit. \par \par Andree is doing well. She denies any anginal symptoms. Clinically she is euvolemic on exam. Her EKG did not reveal any significant ischemic changes. Her PCI to the RCA and OM was in 9/2017. She is now greater than one year out. Given her anemia, increased risk of bleeding I will keep her off of Plavix. She will continue ASA 81mg Qday. She had an nuclear stress test on 4/30/18 which showed fair exercise tolerance with a hypertensive response; she had normal perfusion imaging. She had an echo in 5/2020 that showed normal systolic LV function without any significant other findings, including no significant valvular disease. \par She will continue Torsemide 40mg Qday. \par \par her blood pressure has normalized. Though she is more fatigued now. This may be the hydralazine. I will decrease the hydralazine to 100mg Q12. If her fatigue improves I will decrease the hydralazine and add Doxazosin. She will return for nurse BP check in 2 weeks. She will maintain a bp log. \par  I will continue her Imdur  60mg q12, Coreg 25mg q12, Procardia 30mg Q12. She will continue with the Clonidine to 0.3mg patch  Qweek.She has not tolerated the ARBs because of hyperkalemia.  She did not tolerate the valtasa. \par \par  She will continue with hematology, Rheum and get PRBCs as needed. She has completed Rituxan therapy. Continue folate and b12 oral supplementation.   \par  \par She will continue with statin therapy to achieve maintain goal LDL<100 or ideally <70. She will continue Crestor to 20mg HS.  Her goal LDL <70. She did not tolerate fish oil and the Vascepa was expensive. \par \par Exercise and diet counseling was performed in order to reduce her future cardiovascular risk. \par She will followup with me in  2 months

## 2020-09-10 NOTE — REVIEW OF SYSTEMS
[Feeling Fatigued] : feeling fatigued [Shortness Of Breath] : no shortness of breath [Dyspnea on exertion] : dyspnea during exertion [Lower Ext Edema] : lower extremity edema [Heartburn] : no heartburn [Negative] : Heme/Lymph

## 2020-09-10 NOTE — PHYSICAL EXAM
[Well Groomed] : well groomed [FreeTextEntry1] : appears pale [General Appearance - In No Acute Distress] : no acute distress [Normal Conjunctiva] : the conjunctiva exhibited no abnormalities [Eyelids - No Xanthelasma] : the eyelids demonstrated no xanthelasmas [Normal Oral Mucosa] : normal oral mucosa [No Oral Pallor] : no oral pallor [Normal Jugular Venous A Waves Present] : normal jugular venous A waves present [No Oral Cyanosis] : no oral cyanosis [Normal Jugular Venous V Waves Present] : normal jugular venous V waves present [No Jugular Venous Garcia A Waves] : no jugular venous garcia A waves [Abdomen Soft] : soft [Abdomen Tenderness] : non-tender [Abdomen Mass (___ Cm)] : no abdominal mass palpated [Abnormal Walk] : normal gait [Gait - Sufficient For Exercise Testing] : the gait was sufficient for exercise testing [Cyanosis, Localized] : no localized cyanosis [Nail Clubbing] : no clubbing of the fingernails [Petechial Hemorrhages (___cm)] : no petechial hemorrhages [Skin Color & Pigmentation] : normal skin color and pigmentation [] : no rash [No Venous Stasis] : no venous stasis [No Skin Ulcers] : no skin ulcer [Skin Lesions] : no skin lesions [No Xanthoma] : no  xanthoma was observed [Affect] : the affect was normal [Oriented To Time, Place, And Person] : oriented to person, place, and time [Mood] : the mood was normal [No Anxiety] : not feeling anxious [Normal Rhythm/Effort] : normal respiratory rhythm and effort [Clear Bilaterally] : the lungs were clear to auscultation bilaterally [Normal S1] : normal S1 [Normal Rate] : normal [Rhythm Regular] : regular [Normal S2] : normal S2 [I] : a grade 1 [No Murmur] : no murmurs heard [No Gallop] : no gallop heard [Right Carotid Bruit] : no bruit heard over the right carotid [Left Carotid Bruit] : left carotid bruit heard [Bruit] : no bruit heard [2+] : left 2+ [___ +] : bilateral [unfilled]U+ pretibial pitting edema

## 2020-09-25 ENCOUNTER — APPOINTMENT (OUTPATIENT)
Dept: CARDIOLOGY | Facility: CLINIC | Age: 70
End: 2020-09-25
Payer: MEDICARE

## 2020-09-25 PROCEDURE — 99211 OFF/OP EST MAY X REQ PHY/QHP: CPT

## 2020-09-29 NOTE — ED PROVIDER NOTE - NEURO NEGATIVE STATEMENT, MLM
Warm
no loss of consciousness, no gait abnormality, no headache, no sensory deficits, and no weakness.

## 2020-11-12 ENCOUNTER — NON-APPOINTMENT (OUTPATIENT)
Age: 70
End: 2020-11-12

## 2020-11-12 ENCOUNTER — APPOINTMENT (OUTPATIENT)
Dept: CARDIOLOGY | Facility: CLINIC | Age: 70
End: 2020-11-12
Payer: MEDICARE

## 2020-11-12 VITALS
SYSTOLIC BLOOD PRESSURE: 125 MMHG | BODY MASS INDEX: 24.19 KG/M2 | WEIGHT: 120 LBS | HEIGHT: 59 IN | OXYGEN SATURATION: 95 % | HEART RATE: 74 BPM | DIASTOLIC BLOOD PRESSURE: 70 MMHG

## 2020-11-12 PROCEDURE — 93000 ELECTROCARDIOGRAM COMPLETE: CPT

## 2020-11-12 PROCEDURE — 99214 OFFICE O/P EST MOD 30 MIN: CPT

## 2020-11-12 RX ORDER — MECLIZINE HYDROCHLORIDE 25 MG/1
25 TABLET ORAL 3 TIMES DAILY
Qty: 90 | Refills: 0 | Status: ACTIVE | COMMUNITY
Start: 2020-11-12 | End: 1900-01-01

## 2020-11-12 NOTE — DISCUSSION/SUMMARY
[FreeTextEntry1] : 70 year woman with a history as listed presents for a followup visit. \par \par Andree is doing well overall. She is complaining of vertigo that started today likely from BPPV. will giove her meclizine to take PRN. \par  She denies any anginal symptoms. Clinically she is euvolemic on exam. Her EKG did not reveal any significant ischemic changes. Her PCI to the RCA and OM was in 9/2017. She is now greater than one year out. Given her anemia, increased risk of bleeding I will keep her off of Plavix. She will continue ASA 81mg Qday. She had an nuclear stress test on 4/30/18 which showed fair exercise tolerance with a hypertensive response; she had normal perfusion imaging. She had an echo in 6/2020 that showed normal systolic LV function without any significant other findings, including no significant valvular disease. \par She will continue Torsemide 40mg Qday. \par \par her blood pressure has normalized. She will continue  hydralazine 100mg Q12.  I will continue her Imdur  60mg q12, Coreg 25mg q12, Procardia 30mg Q12. She will continue with the Clonidine to 0.3mg patch  Qweek. She has not tolerated the ARBs because of hyperkalemia.  She did not tolerate the valtasa. \par \par  She will continue with hematology, Rheum and get PRBCs as needed. She has completed Rituxan therapy. Continue folate and b12 oral supplementation.   \par  \par She will continue with statin therapy to achieve maintain goal LDL<100 or ideally <70. She will continue Crestor 20mg HS.  Her goal LDL <70. She did not tolerate fish oil and the Vascepa was expensive. \par \par Exercise and diet counseling was performed in order to reduce her future cardiovascular risk. \par She will followup with me in 3-4 months

## 2020-11-12 NOTE — REVIEW OF SYSTEMS
[Negative] : Heme/Lymph [Feeling Fatigued] : not feeling fatigued [Shortness Of Breath] : no shortness of breath [Dyspnea on exertion] : not dyspnea during exertion [Lower Ext Edema] : no extremity edema [Heartburn] : no heartburn

## 2020-11-12 NOTE — HISTORY OF PRESENT ILLNESS
[FreeTextEntry1] : 70 year old woman with a history of DM, HTN. HLD, anemia, CAD s/p [place] JACQUELINE to OM1 and mRCA secondary to NSTEMI on 9/19/17. \par She was initially seen at [facility] for symptoms of unstable anginal and was found to have a small NSTEMI. Her echo showed normal LV function. \par \par She had a EGD/colonoscopy that showed hemorrhoids, gastritis, gastric ulceration, hiatal hernia. She was placed on carafate and H2 blocker. Her GERD symptoms have improved. She is seeing the hematologist for iron infusion. \par She had a negative capsule study.\par she had an nuclear stress test on 4/30/18 which showed fair exercise tolerance with a hypertensive response; she had normal perfusion imaging. She had an echo on that was showed normal systolic LV function without any significant other findings, including no significant valvular disease. 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 She was started on Mycophenolate by nephrology at [place].  She was advised that the Cellcept is not working and  started on Rituximab. he feeling much better on the Rituximab for 2 dose. She is feeling much better off the Cellcept. \par She got a second opinion from Dr. Óscar Ibanez ([facility]) who thought she may have hemolytic anemia and was started on folic acid and Aranesp. \par \par She is now here for a followup visit. \par Today she had an episode of vertigo when in bed today and when laying in the stretcher. It has not happened otherwise. \par her fatigue has improved overall. Her blood pressure at home are 120s/60s.  \par She  denies any chest pain, PND, near syncope, syncope, strokelike symptoms,lower extremity edema.  \par She is compliant with her medications.   \par   \par

## 2020-11-12 NOTE — PHYSICAL EXAM
[Well Groomed] : well groomed [General Appearance - In No Acute Distress] : no acute distress [Normal Conjunctiva] : the conjunctiva exhibited no abnormalities [Eyelids - No Xanthelasma] : the eyelids demonstrated no xanthelasmas [Normal Oral Mucosa] : normal oral mucosa [No Oral Pallor] : no oral pallor [No Oral Cyanosis] : no oral cyanosis [Normal Jugular Venous A Waves Present] : normal jugular venous A waves present [Normal Jugular Venous V Waves Present] : normal jugular venous V waves present [No Jugular Venous Garcia A Waves] : no jugular venous garcia A waves [Abdomen Soft] : soft [Abdomen Tenderness] : non-tender [Abdomen Mass (___ Cm)] : no abdominal mass palpated [Abnormal Walk] : normal gait [Gait - Sufficient For Exercise Testing] : the gait was sufficient for exercise testing [Nail Clubbing] : no clubbing of the fingernails [Cyanosis, Localized] : no localized cyanosis [Petechial Hemorrhages (___cm)] : no petechial hemorrhages [Skin Color & Pigmentation] : normal skin color and pigmentation [] : no rash [No Venous Stasis] : no venous stasis [Skin Lesions] : no skin lesions [No Skin Ulcers] : no skin ulcer [No Xanthoma] : no  xanthoma was observed [Oriented To Time, Place, And Person] : oriented to person, place, and time [Affect] : the affect was normal [Mood] : the mood was normal [No Anxiety] : not feeling anxious [Normal Rhythm/Effort] : normal respiratory rhythm and effort [Clear Bilaterally] : the lungs were clear to auscultation bilaterally [Normal Rate] : normal [Rhythm Regular] : regular [Normal S1] : normal S1 [Normal S2] : normal S2 [No Gallop] : no gallop heard [I] : a grade 1 [Left Carotid Bruit] : left carotid bruit heard [2+] : left 2+ [___ +] : bilateral [unfilled]U+ pretibial pitting edema [FreeTextEntry1] : appears pale [II] : a grade 2 [Right Carotid Bruit] : no bruit heard over the right carotid [Bruit] : no bruit heard

## 2021-03-11 ENCOUNTER — NON-APPOINTMENT (OUTPATIENT)
Age: 71
End: 2021-03-11

## 2021-03-11 ENCOUNTER — APPOINTMENT (OUTPATIENT)
Dept: CARDIOLOGY | Facility: CLINIC | Age: 71
End: 2021-03-11
Payer: MEDICARE

## 2021-03-11 VITALS
HEART RATE: 67 BPM | WEIGHT: 120 LBS | DIASTOLIC BLOOD PRESSURE: 70 MMHG | SYSTOLIC BLOOD PRESSURE: 120 MMHG | OXYGEN SATURATION: 97 % | BODY MASS INDEX: 24.19 KG/M2 | HEIGHT: 59 IN

## 2021-03-11 PROCEDURE — 99214 OFFICE O/P EST MOD 30 MIN: CPT

## 2021-03-11 PROCEDURE — 93000 ELECTROCARDIOGRAM COMPLETE: CPT

## 2021-03-11 NOTE — DISCUSSION/SUMMARY
[FreeTextEntry1] : 70 year woman with a history as listed presents for a followup visit. \par \par Andree is doing well overall. She denies any anginal symptoms. Clinically she is euvolemic on exam. Her EKG did not reveal any significant ischemic changes. Her PCI to the RCA and OM was in 9/2017. She is now greater than one year out. Given her anemia, increased risk of bleeding I will keep her off of Plavix. She will continue ASA 81mg Qday. She had an nuclear stress test on 4/30/18 which showed fair exercise tolerance with a hypertensive response; she had normal perfusion imaging. She had an echo in 6/2020 that showed normal systolic LV function without any significant other findings, including no significant valvular disease. \par She will continue Torsemide 40mg Qday. Though I think she would be better off on Jardiance or Farxiga and decrease the torsemide. \par \par her blood pressure has normalized. She will continue  hydralazine 100mg Q12.  I will continue her Imdur  60mg q12, Coreg 25mg q12, Procardia 30mg Q12. She will continue with the Clonidine to 0.3mg patch  Qweek. She has not tolerated the ARBs because of hyperkalemia.  She did not tolerate the valtasa. \par \par  She will continue with hematology, Rheum and get PRBCs as needed. She has completed Rituxan therapy. Continue folate and b12 oral supplementation.   \par  \par She will continue with statin therapy to achieve maintain goal LDL<100 or ideally <70. She will continue Crestor 20mg HS.  Her goal LDL <70. She did not tolerate fish oil and the Vascepa was expensive. \par \par Exercise and diet counseling was performed in order to reduce her future cardiovascular risk. \par She will followup with me in 3-4 months

## 2021-03-11 NOTE — HISTORY OF PRESENT ILLNESS
[FreeTextEntry1] : 70 year old woman with a history of DM, HTN. HLD, anemia, CAD s/p [place] JACQUELINE to OM1 and mRCA secondary to NSTEMI on 9/19/17. \par She was initially seen at [facility] for symptoms of unstable anginal and was found to have a small NSTEMI. Her echo showed normal LV function. \par \par She had a EGD/colonoscopy that showed hemorrhoids, gastritis, gastric ulceration, hiatal hernia. She was placed on carafate and H2 blocker. Her GERD symptoms have improved. She is seeing the hematologist for iron infusion. \par She had a negative capsule study.\par she had an nuclear stress test on 4/30/18 which showed fair exercise tolerance with a hypertensive response; she had normal perfusion imaging. She had an echo on that was showed normal systolic LV function without any significant other findings, including no significant valvular disease. 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 She was started on Mycophenolate by nephrology at [place].  She was advised that the Cellcept is not working and  started on Rituximab. he feeling much better on the Rituximab for 2 dose. She is feeling much better off the Cellcept. \par She got a second opinion from Dr. Óscar Ibanez ([facility]) who thought she may have hemolytic anemia and was started on folic acid and Aranesp. \par \par She is now here for a followup visit. \par She has been doing well. Her Hb has relatively been stable. her fatigue has improved overall. Her blood pressure at home are 120s/60s.  \par She  denies any chest pain, PND, near syncope, syncope, strokelike symptoms,lower extremity edema.  \par She is compliant with her medications.   \par   \par

## 2021-03-11 NOTE — REVIEW OF SYSTEMS
[Feeling Fatigued] : not feeling fatigued [Shortness Of Breath] : no shortness of breath [Dyspnea on exertion] : not dyspnea during exertion [Lower Ext Edema] : no extremity edema [Heartburn] : no heartburn [Negative] : Heme/Lymph

## 2021-03-11 NOTE — PHYSICAL EXAM
[Well Groomed] : well groomed [General Appearance - In No Acute Distress] : no acute distress [FreeTextEntry1] : appears pale [Normal Conjunctiva] : the conjunctiva exhibited no abnormalities [Eyelids - No Xanthelasma] : the eyelids demonstrated no xanthelasmas [Normal Oral Mucosa] : normal oral mucosa [No Oral Pallor] : no oral pallor [No Oral Cyanosis] : no oral cyanosis [Normal Jugular Venous A Waves Present] : normal jugular venous A waves present [Normal Jugular Venous V Waves Present] : normal jugular venous V waves present [No Jugular Venous Garcia A Waves] : no jugular venous garcia A waves [Abdomen Soft] : soft [Abdomen Tenderness] : non-tender [Abdomen Mass (___ Cm)] : no abdominal mass palpated [Abnormal Walk] : normal gait [Gait - Sufficient For Exercise Testing] : the gait was sufficient for exercise testing [Nail Clubbing] : no clubbing of the fingernails [Cyanosis, Localized] : no localized cyanosis [Petechial Hemorrhages (___cm)] : no petechial hemorrhages [Skin Color & Pigmentation] : normal skin color and pigmentation [] : no rash [No Venous Stasis] : no venous stasis [Skin Lesions] : no skin lesions [No Skin Ulcers] : no skin ulcer [No Xanthoma] : no  xanthoma was observed [Oriented To Time, Place, And Person] : oriented to person, place, and time [Affect] : the affect was normal [Mood] : the mood was normal [No Anxiety] : not feeling anxious [Normal Rhythm/Effort] : normal respiratory rhythm and effort [Clear Bilaterally] : the lungs were clear to auscultation bilaterally [Normal Rate] : normal [Rhythm Regular] : regular [Normal S1] : normal S1 [Normal S2] : normal S2 [No Gallop] : no gallop heard [II] : a grade 2 [I] : a grade 1 [Right Carotid Bruit] : no bruit heard over the right carotid [Left Carotid Bruit] : left carotid bruit heard [2+] : left 2+ [Bruit] : no bruit heard [___ +] : bilateral [unfilled]U+ pretibial pitting edema

## 2021-06-01 ENCOUNTER — RX RENEWAL (OUTPATIENT)
Age: 71
End: 2021-06-01

## 2021-06-29 ENCOUNTER — NON-APPOINTMENT (OUTPATIENT)
Age: 71
End: 2021-06-29

## 2021-06-29 ENCOUNTER — APPOINTMENT (OUTPATIENT)
Dept: CARDIOLOGY | Facility: CLINIC | Age: 71
End: 2021-06-29
Payer: MEDICARE

## 2021-06-29 VITALS
SYSTOLIC BLOOD PRESSURE: 120 MMHG | OXYGEN SATURATION: 97 % | HEIGHT: 59 IN | WEIGHT: 118 LBS | BODY MASS INDEX: 23.79 KG/M2 | HEART RATE: 67 BPM | DIASTOLIC BLOOD PRESSURE: 70 MMHG

## 2021-06-29 PROCEDURE — 93000 ELECTROCARDIOGRAM COMPLETE: CPT

## 2021-06-29 PROCEDURE — 99214 OFFICE O/P EST MOD 30 MIN: CPT

## 2021-06-29 NOTE — PHYSICAL EXAM
[General Appearance - Well Developed] : well developed [Well Groomed] : well groomed [General Appearance - Well Nourished] : well nourished [General Appearance - In No Acute Distress] : no acute distress [Normal Conjunctiva] : the conjunctiva exhibited no abnormalities [Eyelids - No Xanthelasma] : the eyelids demonstrated no xanthelasmas [Normal Oral Mucosa] : normal oral mucosa [No Oral Pallor] : no oral pallor [No Oral Cyanosis] : no oral cyanosis [Normal Jugular Venous A Waves Present] : normal jugular venous A waves present [Normal Jugular Venous V Waves Present] : normal jugular venous V waves present [No Jugular Venous Garcia A Waves] : no jugular venous garcia A waves [Abdomen Soft] : soft [Abdomen Tenderness] : non-tender [Abdomen Mass (___ Cm)] : no abdominal mass palpated [Abnormal Walk] : normal gait [Gait - Sufficient For Exercise Testing] : the gait was sufficient for exercise testing [Nail Clubbing] : no clubbing of the fingernails [Cyanosis, Localized] : no localized cyanosis [Petechial Hemorrhages (___cm)] : no petechial hemorrhages [] : no rash [Skin Color & Pigmentation] : normal skin color and pigmentation [No Venous Stasis] : no venous stasis [Skin Lesions] : no skin lesions [No Skin Ulcers] : no skin ulcer [No Xanthoma] : no  xanthoma was observed [Oriented To Time, Place, And Person] : oriented to person, place, and time [Affect] : the affect was normal [Mood] : the mood was normal [No Anxiety] : not feeling anxious [Normal Rhythm/Effort] : normal respiratory rhythm and effort [Clear Bilaterally] : the lungs were clear to auscultation bilaterally [Rhythm Regular] : regular [Normal Rate] : normal [Normal S1] : normal S1 [Normal S2] : normal S2 [No Gallop] : no gallop heard [II] : a grade 2 [I] : a grade 1 [Left Carotid Bruit] : left carotid bruit heard [Right Carotid Bruit] : no bruit heard over the right carotid [2+] : left 2+ [Bruit] : no bruit heard [___ +] : bilateral [unfilled]U+ pretibial pitting edema

## 2021-06-29 NOTE — HISTORY OF PRESENT ILLNESS
[FreeTextEntry1] : 70 year old woman with a history of DM, HTN. HLD, anemia, CAD s/p [place] JACQUELINE to OM1 and mRCA secondary to NSTEMI on 9/19/17. \par She was initially seen at [facility] for symptoms of unstable anginal and was found to have a small NSTEMI. Her echo showed normal LV function. \par \par She had a EGD/colonoscopy that showed hemorrhoids, gastritis, gastric ulceration, hiatal hernia. She was placed on carafate and H2 blocker. Her GERD symptoms have improved. She is seeing the hematologist for iron infusion. \par She had a negative capsule study.\par she had an nuclear stress test on 4/30/18 which showed fair exercise tolerance with a hypertensive response; she had normal perfusion imaging. She had an echo on that was showed normal systolic LV function without any significant other findings, including no significant valvular disease. 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 She was started on Mycophenolate by nephrology at [place].  She was advised that the Cellcept is not working and  started on Rituximab. he feeling much better on the Rituximab for 2 dose. She is feeling much better off the Cellcept. \par She got a second opinion from Dr. Óscar Ibanez ([facility]) who thought she may have hemolytic anemia and was started on folic acid and Aranesp. \par \par She is now here for a followup visit. \par She has been doing well. Her Hb has relatively been stable. She is complaining of pervasive fatigue. She denies any dyspnea. She feels a lack of energy. She  denies any chest pain, PND, near syncope, syncope, strokelike symptoms,lower extremity edema.  She is compliant with her medications.   \par   \par

## 2021-06-29 NOTE — CARDIOLOGY SUMMARY
[de-identified] : SR 1 st degree [de-identified] : 4/30/18 nuc 7 MET neg spect [de-identified] : 6/2020 normal systolic LV function [de-identified] : 9/2017 JACQUELINE to Zoey VERDUGOA [de-identified] : 2018 mild plaque

## 2021-06-29 NOTE — DISCUSSION/SUMMARY
[FreeTextEntry1] : 70 year woman with a history as listed presents for a followup visit. \par \par Andree is complaining of pervasive fatigue. Her GRAEME eval in 2018 was negative. She may just be highly deconditioned.  She denies any anginal symptoms. Clinically she is euvolemic on exam. Her EKG did not reveal any significant ischemic changes. Her PCI to the RCA and OM was in 9/2017.  . Given her anemia, increased risk of bleeding I will keep her off of Plavix. She will continue ASA 81mg Qday. \par She will undergo a nuclear stress test to reassess his CAD. She will get a 2d echo to assess for any  new structural heart disease, changes in valvular and ventricular function. \par \par her blood pressure has normalized. She will continue  hydralazine 100mg Q12.  I will continue her Imdur  60mg q12, Coreg 25mg q12,  . She will continue with the Clonidine to 0.3mg patch  Qweek. She decrease the procardia to 30mg HS to see if the fatigue. She has not tolerated the ARBs because of hyperkalemia.  She did not tolerate the valtasa. \par \par She now is on Jardiance and Torsemide 20m Qday.\par \par  She will continue with hematology, Rheum and get PRBCs as needed. She has completed Rituxan therapy. Continue folate and b12 oral supplementation.   \par  \par She will continue with statin therapy to achieve maintain goal LDL<100 or ideally <70. She will continue Crestor 20mg HS.  Her goal LDL <70. She did not tolerate fish oil and the Vascepa was expensive. \par \par Exercise and diet counseling was performed in order to reduce her future cardiovascular risk. \par She will followup with me in 3-4 months

## 2021-07-21 NOTE — ED ADULT NURSE NOTE - DURATION
Detail Level: Simple
Additional Notes: Patient consent was obtained to proceed with the visit and recommended plan of care after discussion of all risks and benefits, including the risks of COVID-19 exposure.
day(s)

## 2021-08-11 ENCOUNTER — APPOINTMENT (OUTPATIENT)
Dept: CARDIOLOGY | Facility: CLINIC | Age: 71
End: 2021-08-11
Payer: MEDICARE

## 2021-08-11 PROCEDURE — 93015 CV STRESS TEST SUPVJ I&R: CPT

## 2021-08-11 PROCEDURE — A9500: CPT

## 2021-08-11 PROCEDURE — 93306 TTE W/DOPPLER COMPLETE: CPT

## 2021-08-11 PROCEDURE — 78452 HT MUSCLE IMAGE SPECT MULT: CPT

## 2021-09-01 ENCOUNTER — NON-APPOINTMENT (OUTPATIENT)
Age: 71
End: 2021-09-01

## 2021-09-01 ENCOUNTER — APPOINTMENT (OUTPATIENT)
Dept: CARDIOLOGY | Facility: CLINIC | Age: 71
End: 2021-09-01
Payer: MEDICARE

## 2021-09-01 VITALS
HEART RATE: 74 BPM | HEIGHT: 59 IN | WEIGHT: 118 LBS | SYSTOLIC BLOOD PRESSURE: 133 MMHG | DIASTOLIC BLOOD PRESSURE: 71 MMHG | OXYGEN SATURATION: 98 % | BODY MASS INDEX: 23.79 KG/M2

## 2021-09-01 DIAGNOSIS — M79.606 PAIN IN LEG, UNSPECIFIED: ICD-10-CM

## 2021-09-01 PROCEDURE — 99214 OFFICE O/P EST MOD 30 MIN: CPT

## 2021-09-01 PROCEDURE — 93000 ELECTROCARDIOGRAM COMPLETE: CPT

## 2021-09-01 NOTE — HISTORY OF PRESENT ILLNESS
[FreeTextEntry1] : She is now here for a followup visit. \par She has been doing well. Her Hb has relatively been stable. She has been off the Arnesp for ~6 months.  She is complaining of pervasive fatigue which is unchanged.  She feels a lack of energy. She notes that when she tries to lay does she notes tingling and something crawling on her legs. \par She denies any dyspnea. She  denies any chest pain, PND, near syncope, syncope, strokelike symptoms,lower extremity edema.  She is compliant with her medications.   \par   \par

## 2021-09-01 NOTE — PHYSICAL EXAM
[General Appearance - Well Developed] : well developed [Well Groomed] : well groomed [General Appearance - Well Nourished] : well nourished [General Appearance - In No Acute Distress] : no acute distress [Normal Conjunctiva] : the conjunctiva exhibited no abnormalities [Eyelids - No Xanthelasma] : the eyelids demonstrated no xanthelasmas [Normal Oral Mucosa] : normal oral mucosa [No Oral Pallor] : no oral pallor [No Oral Cyanosis] : no oral cyanosis [Normal Jugular Venous A Waves Present] : normal jugular venous A waves present [Normal Jugular Venous V Waves Present] : normal jugular venous V waves present [No Jugular Venous Garcia A Waves] : no jugular venous garcia A waves [Abdomen Soft] : soft [Abdomen Tenderness] : non-tender [Abdomen Mass (___ Cm)] : no abdominal mass palpated [Abnormal Walk] : normal gait [Gait - Sufficient For Exercise Testing] : the gait was sufficient for exercise testing [Nail Clubbing] : no clubbing of the fingernails [Cyanosis, Localized] : no localized cyanosis [Petechial Hemorrhages (___cm)] : no petechial hemorrhages [Skin Color & Pigmentation] : normal skin color and pigmentation [] : no rash [No Venous Stasis] : no venous stasis [Skin Lesions] : no skin lesions [No Skin Ulcers] : no skin ulcer [No Xanthoma] : no  xanthoma was observed [Affect] : the affect was normal [Oriented To Time, Place, And Person] : oriented to person, place, and time [Mood] : the mood was normal [No Anxiety] : not feeling anxious [Normal Rhythm/Effort] : normal respiratory rhythm and effort [Clear Bilaterally] : the lungs were clear to auscultation bilaterally [Normal Rate] : normal [Rhythm Regular] : regular [Normal S1] : normal S1 [Normal S2] : normal S2 [II] : a grade 2 [No Gallop] : no gallop heard [I] : a grade 1 [Left Carotid Bruit] : left carotid bruit heard [2+] : left 2+ [___ +] : bilateral [unfilled]U+ pretibial pitting edema [Right Carotid Bruit] : no bruit heard over the right carotid [Bruit] : no bruit heard

## 2021-09-01 NOTE — DISCUSSION/SUMMARY
[FreeTextEntry1] : 70 year woman with a history as listed presents for a followup visit. \par \par Andree is complaining of pervasive fatigue. Her GRAEME eval in 2018 was negative. It is likely from her RLS that does not allow her to sleep. She had marked side effects to Mirapex. She will speak to you about further treatment. She will get a arterial duplex to rule out PAD. \par \par She may just be highly deconditioned.  She denies any anginal symptoms. Clinically she is euvolemic on exam. Her EKG did not reveal any significant ischemic changes. Her PCI to the RCA and OM was in 9/2017.  . Given her anemia, increased risk of bleeding I will keep her off of Plavix. She will continue ASA 81mg Qday. Her testing n 8/2021 was unremarkable.  \par \par her blood pressure has normalized. She will continue  hydralazine 100mg Q12.  I will continue her Imdur  60mg q12, Coreg 25mg q12,  . She will continue with the Clonidine to 0.3mg patch  Qweek. She decrease the procardia to 30mg HS to see if the fatigue. She has not tolerated the ARBs because of hyperkalemia.  She did not tolerate the valtasa. \par \par She now is on  Torsemide 20m Qday. Jardiance wa stopped 2/2 to cost. She will speak with endocrine. \par \par  She will continue with hematology, Rheum and get PRBCs as needed. She has completed Rituxan therapy. Continue folate and b12 oral supplementation.   \par  \par She will continue with statin therapy to achieve maintain goal LDL<100 or ideally <70. She will continue Crestor 20mg HS.  Her goal LDL <70. She did not tolerate fish oil and the Vascepa was expensive. \par \par Exercise and diet counseling was performed in order to reduce her future cardiovascular risk. \par She will followup with me in 3-4 months

## 2021-09-01 NOTE — CARDIOLOGY SUMMARY
[de-identified] : SR 1 st degree [de-identified] : 4/30/18 nuc 7 MET neg spect\par 8/2021 pharm sterss neg spect  [de-identified] : 6/2020 normal systolic LV function\par 8/2021 normal LV function.  [de-identified] : 9/2017 JACQUELINE to Zoey VERDUGOA [de-identified] : 2018 mild plaque

## 2021-09-01 NOTE — REASON FOR VISIT
[FreeTextEntry1] : 70 year old woman with a history of DM, HTN. HLD, anemia, CAD s/p [place] JACQUELINE to OM1 and mRCA secondary to NSTEMI on 9/19/17. \par She was initially seen at [facility] for symptoms of unstable anginal and was found to have a small NSTEMI. Her echo showed normal LV function. \par \par She had a EGD/colonoscopy that showed hemorrhoids, gastritis, gastric ulceration, hiatal hernia. She was placed on carafate and H2 blocker. Her GERD symptoms have improved. She is seeing the hematologist for iron infusion. \par She had a negative capsule study.\par she had an nuclear stress test on 4/30/18 which showed fair exercise tolerance with a hypertensive response; she had normal perfusion imaging. She had an echo on that was showed normal systolic LV function without any significant other findings, including no significant valvular disease. 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 She was started on Mycophenolate by nephrology at [place].  She was advised that the Cellcept is not working and  started on Rituximab. he feeling much better on the Rituximab for 2 dose. She is feeling much better off the Cellcept. \par She got a second opinion from Dr. Óscar Ibanez ([facility]) who thought she may have hemolytic anemia and was started on folic acid and Aranesp.

## 2021-09-01 NOTE — REVIEW OF SYSTEMS
stable, s/p plication  pt tolerating CC diet well [Feeling Fatigued] : feeling fatigued [Negative] : Heme/Lymph

## 2021-09-13 ENCOUNTER — APPOINTMENT (OUTPATIENT)
Dept: CARDIOLOGY | Facility: CLINIC | Age: 71
End: 2021-09-13
Payer: MEDICARE

## 2021-09-13 PROCEDURE — 93925 LOWER EXTREMITY STUDY: CPT

## 2021-12-01 ENCOUNTER — NON-APPOINTMENT (OUTPATIENT)
Age: 71
End: 2021-12-01

## 2021-12-01 ENCOUNTER — APPOINTMENT (OUTPATIENT)
Dept: CARDIOLOGY | Facility: CLINIC | Age: 71
End: 2021-12-01
Payer: MEDICARE

## 2021-12-01 VITALS
OXYGEN SATURATION: 98 % | HEART RATE: 70 BPM | HEIGHT: 59 IN | WEIGHT: 117 LBS | BODY MASS INDEX: 23.59 KG/M2 | SYSTOLIC BLOOD PRESSURE: 146 MMHG | DIASTOLIC BLOOD PRESSURE: 72 MMHG

## 2021-12-01 PROCEDURE — 93000 ELECTROCARDIOGRAM COMPLETE: CPT

## 2021-12-01 PROCEDURE — 99214 OFFICE O/P EST MOD 30 MIN: CPT

## 2021-12-01 NOTE — CARDIOLOGY SUMMARY
[de-identified] : SR 1 st degree [de-identified] : 4/30/18 nuc 7 MET neg spect\par 8/2021 pharm sterss neg spect  [de-identified] : 6/2020 normal systolic LV function\par 8/2021 normal LV function.  [de-identified] : 9/2017 JACQUELINE to Zoey VERDUGOA [de-identified] : 2018 mild plaque carotids\par 9/2021 lower ext edema: mild atherosclerosis.

## 2021-12-01 NOTE — REASON FOR VISIT
[FreeTextEntry1] : 70 year old woman with a history of DM, HTN. HLD, anemia, CAD s/p [place] JACQUELINE to OM1 and mRCA secondary to NSTEMI on 9/19/17. \par She was initially seen at [facility] for symptoms of unstable anginal and was found to have a small NSTEMI. Her echo showed normal LV function. \par \par She had a EGD/colonoscopy that showed hemorrhoids, gastritis, gastric ulceration, hiatal hernia. She was placed on carafate and H2 blocker. Her GERD symptoms have improved. She is seeing the hematologist for iron infusion. \par She had a negative capsule study.\par she had an nuclear stress test on 4/30/18 which showed fair exercise tolerance with a hypertensive response; she had normal perfusion imaging. She had an echo on that was showed normal systolic LV function without any significant other findings, including no significant valvular disease. 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 She was started on Mycophenolate by nephrology at [place].  She was advised that the Cellcept is not working and  started on Rituximab. he feeling much better on the Rituximab for 2 dose. She is feeling much better off the Cellcept. \par She got a second opinion from Dr. Óscar Ibanez ([facility]) who thought she may have hemolytic anemia and was started on folic acid and Aranesp.

## 2021-12-01 NOTE — PHYSICAL EXAM
[General Appearance - Well Developed] : well developed [Well Groomed] : well groomed [General Appearance - Well Nourished] : well nourished [General Appearance - In No Acute Distress] : no acute distress [Normal Conjunctiva] : the conjunctiva exhibited no abnormalities [Eyelids - No Xanthelasma] : the eyelids demonstrated no xanthelasmas [Normal Oral Mucosa] : normal oral mucosa [No Oral Pallor] : no oral pallor [No Oral Cyanosis] : no oral cyanosis [Normal Jugular Venous A Waves Present] : normal jugular venous A waves present [Normal Jugular Venous V Waves Present] : normal jugular venous V waves present [No Jugular Venous Garcia A Waves] : no jugular venous garcia A waves [Abdomen Soft] : soft [Abdomen Tenderness] : non-tender [Abdomen Mass (___ Cm)] : no abdominal mass palpated [Abnormal Walk] : normal gait [Gait - Sufficient For Exercise Testing] : the gait was sufficient for exercise testing [Nail Clubbing] : no clubbing of the fingernails [Cyanosis, Localized] : no localized cyanosis [Petechial Hemorrhages (___cm)] : no petechial hemorrhages [Skin Color & Pigmentation] : normal skin color and pigmentation [] : no rash [No Venous Stasis] : no venous stasis [Skin Lesions] : no skin lesions [No Skin Ulcers] : no skin ulcer [No Xanthoma] : no  xanthoma was observed [Oriented To Time, Place, And Person] : oriented to person, place, and time [Affect] : the affect was normal [Mood] : the mood was normal [No Anxiety] : not feeling anxious [Normal Rhythm/Effort] : normal respiratory rhythm and effort [Clear Bilaterally] : the lungs were clear to auscultation bilaterally [Normal Rate] : normal [Rhythm Regular] : regular [Normal S1] : normal S1 [Normal S2] : normal S2 [No Gallop] : no gallop heard [II] : a grade 2 [I] : a grade 1 [Right Carotid Bruit] : no bruit heard over the right carotid [Left Carotid Bruit] : left carotid bruit heard [2+] : left 2+ [Bruit] : no bruit heard [___ +] : bilateral [unfilled]U+ pretibial pitting edema

## 2021-12-01 NOTE — HISTORY OF PRESENT ILLNESS
[FreeTextEntry1] : She is now here for a followup visit. \par She has been relatively doing well. She has been doing well. Her Hb has relatively been stable.  She is complaining of pervasive fatigue which has improved. She is still not sleeping well at nights still because of an electrilcal going down her left leg. \par She denies any dyspnea. She  denies any chest pain, PND, near syncope, syncope, strokelike symptoms,lower extremity edema.  She is compliant with her medications.   \par   \par

## 2022-02-27 RX ORDER — TORSEMIDE 20 MG/1
20 TABLET ORAL DAILY
Qty: 180 | Refills: 3 | Status: ACTIVE | COMMUNITY
Start: 2019-04-09

## 2022-03-21 NOTE — ED PROVIDER NOTE - CPE EDP HEAD NORM PED
Patient requests all Lab, Cardiology, and Radiology Results on their Discharge Instructions Head atraumatic, normal cephalic shape.

## 2022-03-25 ENCOUNTER — APPOINTMENT (OUTPATIENT)
Dept: CARDIOLOGY | Facility: CLINIC | Age: 72
End: 2022-03-25
Payer: MEDICARE

## 2022-03-25 ENCOUNTER — NON-APPOINTMENT (OUTPATIENT)
Age: 72
End: 2022-03-25

## 2022-03-25 VITALS
WEIGHT: 117 LBS | HEIGHT: 59 IN | HEART RATE: 74 BPM | BODY MASS INDEX: 23.59 KG/M2 | SYSTOLIC BLOOD PRESSURE: 160 MMHG | OXYGEN SATURATION: 97 % | DIASTOLIC BLOOD PRESSURE: 68 MMHG

## 2022-03-25 VITALS — SYSTOLIC BLOOD PRESSURE: 128 MMHG | DIASTOLIC BLOOD PRESSURE: 64 MMHG

## 2022-03-25 DIAGNOSIS — R06.00 DYSPNEA, UNSPECIFIED: ICD-10-CM

## 2022-03-25 DIAGNOSIS — G47.10 HYPERSOMNIA, UNSPECIFIED: ICD-10-CM

## 2022-03-25 PROCEDURE — 93000 ELECTROCARDIOGRAM COMPLETE: CPT

## 2022-03-25 PROCEDURE — 99214 OFFICE O/P EST MOD 30 MIN: CPT

## 2022-03-25 NOTE — PHYSICAL EXAM
[General Appearance - Well Developed] : well developed [Well Groomed] : well groomed [General Appearance - Well Nourished] : well nourished [General Appearance - In No Acute Distress] : no acute distress [Normal Conjunctiva] : the conjunctiva exhibited no abnormalities [Eyelids - No Xanthelasma] : the eyelids demonstrated no xanthelasmas [Normal Oral Mucosa] : normal oral mucosa [No Oral Pallor] : no oral pallor [No Oral Cyanosis] : no oral cyanosis [Normal Jugular Venous A Waves Present] : normal jugular venous A waves present [Normal Jugular Venous V Waves Present] : normal jugular venous V waves present [No Jugular Venous Garcia A Waves] : no jugular venous garcia A waves [Abdomen Soft] : soft [Abdomen Tenderness] : non-tender [Abdomen Mass (___ Cm)] : no abdominal mass palpated [Abnormal Walk] : normal gait [Gait - Sufficient For Exercise Testing] : the gait was sufficient for exercise testing [Nail Clubbing] : no clubbing of the fingernails [Cyanosis, Localized] : no localized cyanosis [Petechial Hemorrhages (___cm)] : no petechial hemorrhages [Skin Color & Pigmentation] : normal skin color and pigmentation [] : no rash [No Venous Stasis] : no venous stasis [Skin Lesions] : no skin lesions [No Skin Ulcers] : no skin ulcer [No Xanthoma] : no  xanthoma was observed [Oriented To Time, Place, And Person] : oriented to person, place, and time [Affect] : the affect was normal [Mood] : the mood was normal [No Anxiety] : not feeling anxious [Normal Rhythm/Effort] : normal respiratory rhythm and effort [Clear Bilaterally] : the lungs were clear to auscultation bilaterally [Normal Rate] : normal [Rhythm Regular] : regular [Normal S1] : normal S1 [Normal S2] : normal S2 [No Gallop] : no gallop heard [II] : a grade 2 [I] : a grade 1 [Left Carotid Bruit] : left carotid bruit heard [2+] : left 2+ [___ +] : bilateral [unfilled]U+ pretibial pitting edema [Right Carotid Bruit] : no bruit heard over the right carotid [Bruit] : no bruit heard

## 2022-03-25 NOTE — CARDIOLOGY SUMMARY
[de-identified] : SR 1 st degree [de-identified] : 4/30/18 nuc 7 MET neg spect\par 8/2021 pharm stress neg spect  [de-identified] : 6/2020 normal systolic LV function\par 8/2021 normal LV function.  [de-identified] : 9/2017 JACQUELINE to Zoey VERDUGOA [de-identified] : 2018 mild plaque carotids\par 9/2021 lower ext edema: mild atherosclerosis.

## 2022-03-25 NOTE — HISTORY OF PRESENT ILLNESS
[FreeTextEntry1] : She is now here for a followup visit. \par \par She has been relatively doing well. She has been doing well. Her Hb has relatively been stable. She has not taken Aranesp in weeks. \par She is complaining of pervasive fatigue which has improved overall. It mainly occurs after taking her morning pills. She has been sleeping better now that her leg pains have improved. \par She denies any dyspnea. She  denies any chest pain, PND, near syncope, syncope, strokelike symptoms,lower extremity edema.  She is compliant with her medications.  She is now on jardiance. \par   \par

## 2022-03-25 NOTE — REASON FOR VISIT
[FreeTextEntry1] : 70 year old woman with a history of DM, HTN. HLD, anemia, CAD s/p [place] JACQUELINE to OM1 and mRCA secondary to NSTEMI on 9/19/17. \par She was initially seen at [facility] for symptoms of unstable anginal and was found to have a small NSTEMI. Her echo showed normal LV function. \par \par She had a EGD/colonoscopy that showed hemorrhoids, gastritis, gastric ulceration, hiatal hernia. She was placed on carafate and H2 blocker. Her GERD symptoms have improved. She is seeing the hematologist for iron infusion. \par She had a negative capsule study.\par she had an nuclear stress test on 4/30/18 which showed fair exercise tolerance with a hypertensive response; she had normal perfusion imaging. She had an echo on that was showed normal systolic LV function without any significant other findings, including no significant valvular disease. 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 She was started on Mycophenolate by nephrology at [place].  She was advised that the Cellcept is not working and  started on Rituximab. he feeling much better on the Rituximab for 2 dose. She is feeling much better off the Cellcept. \par She got a second opinion from Dr. Óscar Ibanez ([facility]) who thought she may have hemolytic anemia and was started on folic acid and Aranesp.

## 2022-03-25 NOTE — DISCUSSION/SUMMARY
[FreeTextEntry1] : 71 year woman with a history as listed presents for a followup visit. \par \par Andree is relatively stable. her fatigue is multifactorial.  Her GRAEME eval in 2018 was negative. It is likely a combination of her poor sleeping and her medications.  \par \par She denies any anginal symptoms. Clinically she is euvolemic on exam. Her EKG did not reveal any significant ischemic changes. Her PCI to the RCA and OM was in 9/2017. Given her anemia, increased risk of bleeding I will keep her off of Plavix. She will continue ASA 81mg Qday. Her testing n 8/2021 was unremarkable.  \par \par her blood pressure has normalized. She will continue  hydralazine 100mg Q12.  I will continue her Imdur  60mg q12, Coreg 25mg q12,  . She will continue with the Clonidine to 0.3mg patch Qweek. She will continue  procardia 30mg HS. She has not tolerated the ARBs because of hyperkalemia.  She did not tolerate the valtasa. \par \par She now is on Torsemide 20m Qday. Jardiance was stopped 2/2 to cost.  She will see if farxiga is cheaper. \par \par  She will continue with hematology, Rheum and get PRBCs as needed. She has completed Rituxan therapy. Continue folate and b12 oral supplementation.   \par  \par She will continue with statin therapy to achieve maintain goal LDL<100 or ideally <70. She will continue Crestor 20mg HS.  Her goal LDL <70. She did not tolerate fish oil and the Vascepa was expensive. \par \par Exercise and diet counseling was performed in order to reduce her future cardiovascular risk. \par She will followup with me in 3-4 months

## 2022-06-06 ENCOUNTER — RX RENEWAL (OUTPATIENT)
Age: 72
End: 2022-06-06

## 2022-06-28 ENCOUNTER — APPOINTMENT (OUTPATIENT)
Dept: CARDIOLOGY | Facility: CLINIC | Age: 72
End: 2022-06-28

## 2022-06-28 ENCOUNTER — NON-APPOINTMENT (OUTPATIENT)
Age: 72
End: 2022-06-28

## 2022-06-28 VITALS
DIASTOLIC BLOOD PRESSURE: 69 MMHG | HEIGHT: 59 IN | BODY MASS INDEX: 23.59 KG/M2 | WEIGHT: 117 LBS | HEART RATE: 67 BPM | OXYGEN SATURATION: 98 % | SYSTOLIC BLOOD PRESSURE: 121 MMHG

## 2022-06-28 VITALS — SYSTOLIC BLOOD PRESSURE: 110 MMHG | DIASTOLIC BLOOD PRESSURE: 60 MMHG

## 2022-06-28 PROCEDURE — 93000 ELECTROCARDIOGRAM COMPLETE: CPT

## 2022-06-28 PROCEDURE — 99214 OFFICE O/P EST MOD 30 MIN: CPT

## 2022-06-28 RX ORDER — LEVOTHYROXINE SODIUM 25 UG/1
25 TABLET ORAL
Refills: 0 | Status: DISCONTINUED | COMMUNITY
Start: 2018-05-31 | End: 2022-06-28

## 2022-06-28 RX ORDER — AMOXICILLIN 500 MG/1
500 CAPSULE ORAL
Qty: 30 | Refills: 0 | Status: DISCONTINUED | COMMUNITY
Start: 2022-02-21

## 2022-06-28 RX ORDER — SUCRALFATE 1 G/10ML
1 SUSPENSION ORAL
Refills: 0 | Status: DISCONTINUED | COMMUNITY
Start: 2018-10-28 | End: 2022-06-28

## 2022-06-28 RX ORDER — INSULIN LISPRO 100 [IU]/ML
(75-25) 100 INJECTION, SUSPENSION SUBCUTANEOUS
Qty: 30 | Refills: 0 | Status: ACTIVE | COMMUNITY
Start: 2022-03-01

## 2022-06-28 RX ORDER — MYCOPHENOLATE MOFETIL 500 MG/1
500 TABLET ORAL
Qty: 120 | Refills: 0 | Status: DISCONTINUED | COMMUNITY
Start: 2018-07-31 | End: 2022-06-28

## 2022-06-28 RX ORDER — NIFEDIPINE 30 MG/1
30 TABLET, FILM COATED, EXTENDED RELEASE ORAL
Qty: 180 | Refills: 3 | Status: DISCONTINUED | COMMUNITY
Start: 2019-06-28 | End: 2022-06-28

## 2022-06-28 RX ORDER — AMOXICILLIN 500 MG/1
500 TABLET, FILM COATED ORAL
Qty: 30 | Refills: 0 | Status: DISCONTINUED | COMMUNITY
Start: 2022-04-04

## 2022-06-29 NOTE — REASON FOR VISIT
[FreeTextEntry1] : 70 year old woman with a history of DM, HTN. HLD, anemia, CAD s/p [place] JACQUELINE to OM1 and mRCA secondary to NSTEMI on 9/19/17. \par She was initially seen at [facility] for symptoms of unstable anginal and was found to have a small NSTEMI. Her echo showed normal LV function. \par \par She had a EGD/colonoscopy that showed hemorrhoids, gastritis, gastric ulceration, hiatal hernia. She was placed on carafate and H2 blocker. Her GERD symptoms have improved. She is seeing the hematologist for iron infusion. \par She had a negative capsule study.\par she had an nuclear stress test on 4/30/18 which showed fair exercise tolerance with a hypertensive response; she had normal perfusion imaging. She had an echo on that was showed normal systolic LV function without any significant other findings, including no significant valvular disease. 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 She was started on Mycophenolate by nephrology at [place].  She was advised that the Cellcept is not working and  started on Rituximab. he feeling much better on the Rituximab for 2 dose. She is feeling much better off the Cellcept. \par She got a second opinion from Dr. Óscar Ibanez ([facility]) who thought she may have hemolytic anemia and was started on folic acid and Aranesp.

## 2022-06-29 NOTE — END OF VISIT
[FreeTextEntry3] : I saw and evaluated the patient and discussed the care with the NP provider above on 06/28/2022 . I agree with the findings and plan as documented in the note above.  Patient is feeling well today.  She denies any anginal symptoms.  She is euvolemic on exam.  Her blood pressure is doing well.  She will continue her medications as listed above.  She will follow-up with hematology.  She will follow-up with me in 6 months.

## 2022-06-29 NOTE — HISTORY OF PRESENT ILLNESS
[FreeTextEntry1] : She is now here for a followup visit. Last office visit on 3/25/22:\par \par Previous visit history:\par \par She has been relatively doing well. She has been doing well. Her Hb has relatively been stable. She has not taken Aranesp in weeks. \par She is complaining of pervasive fatigue which has improved overall. It mainly occurs after taking her morning pills. She has been sleeping better now that her leg pains have improved. \par She denies any dyspnea. She  denies any chest pain, PND, near syncope, syncope, strokelike symptoms,lower extremity edema.  She is compliant with her medications.  She is now on jardiance. \par   \par  She presents today 6/28/22 for routine follow up visit:\par \par She states feeling well overall.  No acute events or concerns since last office visit.  She has been able to walk around her block a few times once aday, however she experiences hip and leg stiffness and pain with extended ambulation.\par She denies dyspnea on exertion, dizzy spells, chest pains or pressures.  she has not been taking her blood pressure much at home because they have been controlled over all.  \par she has been compliant with medications.

## 2022-06-29 NOTE — CARDIOLOGY SUMMARY
[de-identified] : SR 1 st degree 67 bpm [de-identified] : 4/30/18 nuc 7 MET neg spect\par 8/2021 pharm stress neg spect  [de-identified] : 6/2020 normal systolic LV function\par 8/2021 normal LV function. EF 64%\par mild diastolic dys [de-identified] : 9/2017 JACQUELINE to Zoey VERDUGOA [de-identified] : 2018 mild plaque carotids\par 9/2021 lower ext edema: mild atherosclerosis.

## 2022-06-29 NOTE — PHYSICAL EXAM
[Well Developed] : well developed [Well Nourished] : well nourished [No Acute Distress] : no acute distress [Normal Venous Pressure] : normal venous pressure [No Carotid Bruit] : no carotid bruit [Normal S1, S2] : normal S1, S2 [No Murmur] : no murmur [No Rub] : no rub [No Pitting Edema] : no pitting edema present [No Abnormalities] : the abdominal aorta was not enlarged and no bruit was heard [Clear Lung Fields] : clear lung fields [Good Air Entry] : good air entry [No Respiratory Distress] : no respiratory distress  [Soft] : abdomen soft [Non Tender] : non-tender [No Masses/organomegaly] : no masses/organomegaly [Normal Bowel Sounds] : normal bowel sounds [Normal Gait] : normal gait [No Edema] : no edema [No Cyanosis] : no cyanosis [No Clubbing] : no clubbing [No Varicosities] : no varicosities [No Rash] : no rash [No Skin Lesions] : no skin lesions [Moves all extremities] : moves all extremities [No Focal Deficits] : no focal deficits [Normal Speech] : normal speech [Alert and Oriented] : alert and oriented [Normal memory] : normal memory [General Appearance - Well Developed] : well developed [Well Groomed] : well groomed [General Appearance - Well Nourished] : well nourished [General Appearance - In No Acute Distress] : no acute distress [Normal Conjunctiva] : the conjunctiva exhibited no abnormalities [Eyelids - No Xanthelasma] : the eyelids demonstrated no xanthelasmas [Normal Oral Mucosa] : normal oral mucosa [No Oral Pallor] : no oral pallor [No Oral Cyanosis] : no oral cyanosis [Normal Jugular Venous A Waves Present] : normal jugular venous A waves present [Normal Jugular Venous V Waves Present] : normal jugular venous V waves present [No Jugular Venous Garcia A Waves] : no jugular venous garcia A waves [Abdomen Soft] : soft [Abdomen Tenderness] : non-tender [Abdomen Mass (___ Cm)] : no abdominal mass palpated [Abnormal Walk] : normal gait [Gait - Sufficient For Exercise Testing] : the gait was sufficient for exercise testing [Nail Clubbing] : no clubbing of the fingernails [Cyanosis, Localized] : no localized cyanosis [Petechial Hemorrhages (___cm)] : no petechial hemorrhages [Skin Color & Pigmentation] : normal skin color and pigmentation [] : no rash [No Venous Stasis] : no venous stasis [Skin Lesions] : no skin lesions [No Skin Ulcers] : no skin ulcer [No Xanthoma] : no  xanthoma was observed [Oriented To Time, Place, And Person] : oriented to person, place, and time [Mood] : the mood was normal [Affect] : the affect was normal [No Anxiety] : not feeling anxious [Normal Rhythm/Effort] : normal respiratory rhythm and effort [Clear Bilaterally] : the lungs were clear to auscultation bilaterally [Normal Rate] : normal [Rhythm Regular] : regular [Normal S1] : normal S1 [Normal S2] : normal S2 [No Gallop] : no gallop heard [II] : a grade 2 [I] : a grade 1 [2+] : left 2+ [___ +] : bilateral [unfilled]U+ pretibial pitting edema [Left Carotid Bruit] : no bruit heard over the left carotid [Right Carotid Bruit] : no bruit heard over the right carotid [Bruit] : no bruit heard

## 2022-06-29 NOTE — DISCUSSION/SUMMARY
[FreeTextEntry1] : 71 year woman with a history as listed presents for a followup visit. \par \par Andree is in no acute distress.  She is euvolemic on exam.  ECG is unchanged from previous, illustrates sinus rhythm with first degree AVB. Her blood pressure continues to be controlled.  She does not have symptoms consistent with angina.  She is relatively stable. \par  Her GRAEME eval in 2018 was negative.  \par \par Her PCI to the RCA and OM was in 9/2017. Given her anemia, increased risk of bleeding I will keep her off of Plavix. She will continue ASA 81mg Qday. Her testing  8/2021 was unremarkable.  \par She will continue with hematology, Rheum and get PRBCs as needed. She has completed Rituxan therapy. Continue folate and b12 oral supplementation. Last Aranesp infusion was about 6 months ago.   \par \par Her blood pressure is at goal.  She will continue  hydralazine 100mg Q12.  I will continue her Imdur  60mg q12, Coreg 25mg q12,  . She will continue with the Clonidine to 0.3mg patch Qweek. She will continue  procardia 30mg HS. She has not tolerated the ARBs because of hyperkalemia.  She did not tolerate the valtasa. \par \par She now is on Torsemide 20m Qday. She is on Jardiance.  \par \par  \par  She will continue with statin therapy to achieve maintain goal LDL<100 or ideally <70. She will continue Crestor 20mg HS.  Her goal LDL <70. She did not tolerate fish oil and the Vascepa was expensive. \par \par To help improve her mobility,I will send her for outpatient physical therapy.\par \par Exercise and diet counseling was performed in order to reduce her future cardiovascular risk. \par She will followup with me in 6 months  [EKG obtained to assist in diagnosis and management of assessed problem(s)] : EKG obtained to assist in diagnosis and management of assessed problem(s)

## 2022-07-10 ENCOUNTER — NON-APPOINTMENT (OUTPATIENT)
Age: 72
End: 2022-07-10

## 2022-08-29 ENCOUNTER — RX RENEWAL (OUTPATIENT)
Age: 72
End: 2022-08-29

## 2022-08-30 RX ORDER — ROSUVASTATIN CALCIUM 20 MG/1
20 TABLET, FILM COATED ORAL
Qty: 90 | Refills: 3 | Status: ACTIVE | COMMUNITY
Start: 2018-12-28 | End: 1900-01-01

## 2022-12-06 ENCOUNTER — RX RENEWAL (OUTPATIENT)
Age: 72
End: 2022-12-06

## 2022-12-29 ENCOUNTER — APPOINTMENT (OUTPATIENT)
Dept: CARDIOLOGY | Facility: CLINIC | Age: 72
End: 2022-12-29
Payer: MEDICARE

## 2022-12-29 ENCOUNTER — NON-APPOINTMENT (OUTPATIENT)
Age: 72
End: 2022-12-29

## 2022-12-29 ENCOUNTER — RX RENEWAL (OUTPATIENT)
Age: 72
End: 2022-12-29

## 2022-12-29 VITALS
WEIGHT: 116 LBS | HEIGHT: 59 IN | HEART RATE: 73 BPM | SYSTOLIC BLOOD PRESSURE: 110 MMHG | OXYGEN SATURATION: 98 % | DIASTOLIC BLOOD PRESSURE: 64 MMHG | BODY MASS INDEX: 23.39 KG/M2

## 2022-12-29 PROCEDURE — 99214 OFFICE O/P EST MOD 30 MIN: CPT

## 2022-12-29 PROCEDURE — 93000 ELECTROCARDIOGRAM COMPLETE: CPT

## 2022-12-29 NOTE — DISCUSSION/SUMMARY
[FreeTextEntry1] : 72 year woman with a history as listed presents for a followup visit. \par \par Andree is in no acute distress.  She is euvolemic on exam.  ECG is unchanged from previous, illustrates sinus rhythm with first degree AVB. Her blood pressure continues to be controlled.  She does not have symptoms consistent with angina.  She is relatively stable. \par \par Her PCI to the RCA and OM was in 9/2017. Given her anemia, increased risk of bleeding I will keep her off of Plavix. She will continue ASA 81mg Qday. Her testing  8/2021 was unremarkable.  \par She will continue with hematology, Rheum and get PRBCs as needed. She has completed Rituxan therapy. Continue folate and b12 oral supplementation. Last Aranesp infusion was about 6 months ago.   \par \par Her blood pressure is at goal.  She will continue  hydralazine 100mg Q12.  I will continue her Imdur  60mg q12, Coreg 25mg q12,  . She will continue with the Clonidine to 0.3mg patch Qweek. She will continue  procardia 30mg HS. She has not tolerated the ARBs because of hyperkalemia.  She did not tolerate the valtasa. \par \par She now is on Torsemide 20m QOD. She is on Jardiance.  \par \par  \par  She will continue with statin therapy to achieve maintain goal LDL<100 or ideally <70. She will continue Crestor 20mg HS.  Her goal LDL <70. She did not tolerate fish oil and the Vascepa was expensive. \par \par Her GRAEME eval in 2018 was negative.  \par \par Exercise and diet counseling was performed in order to reduce her future cardiovascular risk. \par She will followup with me in 6 months  [EKG obtained to assist in diagnosis and management of assessed problem(s)] : EKG obtained to assist in diagnosis and management of assessed problem(s)

## 2022-12-29 NOTE — REASON FOR VISIT
[FreeTextEntry1] : 70 year old woman with a history of DM, HTN. HLD, anemia, CAD s/p [place] JACQUELINE to OM1 and mRCA secondary to NSTEMI on 9/19/17. \par She was initially seen at [facility] for symptoms of unstable anginal and was found to have a small NSTEMI. Her echo showed normal LV function. \par \par She had a EGD/colonoscopy that showed hemorrhoids, gastritis, gastric ulceration, hiatal hernia. She was placed on carafate and H2 blocker. Her GERD symptoms have improved. She is seeing the hematologist for iron infusion. \par She had a negative capsule study.\par she had an nuclear stress test on 4/30/18 which showed fair exercise tolerance with a hypertensive response; she had normal perfusion imaging. She had an echo on that was showed normal systolic LV function without any significant other findings, including no significant valvular disease. 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 She was started on Mycophenolate by nephrology at [place].  She was advised that the Cellcept is not working and  started on Rituximab. he feeling much better on the Rituximab for 2 dose. She is feeling much better off the Cellcept. \par She got a second opinion from Dr. Óscar Ibanez ([facility]) who thought she may have hemolytic anemia and was started on folic acid and Aranesp.

## 2022-12-29 NOTE — HISTORY OF PRESENT ILLNESS
[FreeTextEntry1] : She is here for a followup visit. \par Since her last visit, she has been relatively doing well. She has been doing well. Her Hb has relatively been stable.\par Her fatigue has basically resolved.  She has been more sedentary with the cold weather. She is going to PT about 2 times a week. She denies any dyspnea. She  denies any chest pain, PND, near syncope, syncope, strokelike symptoms,lower extremity edema.  She is compliant with her medications.

## 2022-12-29 NOTE — CARDIOLOGY SUMMARY
[de-identified] : SR 1 st degree   [de-identified] : 4/30/18 nuc 7 MET neg spect\par 8/2021 pharm stress neg spect  [de-identified] : 6/2020 normal systolic LV function\par 8/2021 normal LV function. EF 64% mild diastolic dys [de-identified] : 9/2017 JACQUELINE to [place] VERDUGOA [de-identified] : 2018 mild plaque carotids\par 9/2021 lower ext edema: mild atherosclerosis.

## 2022-12-29 NOTE — PHYSICAL EXAM
[Well Developed] : well developed [Well Nourished] : well nourished [No Acute Distress] : no acute distress [Normal Venous Pressure] : normal venous pressure [No Carotid Bruit] : no carotid bruit [Normal S1, S2] : normal S1, S2 [No Murmur] : no murmur [No Rub] : no rub [No Pitting Edema] : no pitting edema present [No Abnormalities] : the abdominal aorta was not enlarged and no bruit was heard [Clear Lung Fields] : clear lung fields [Good Air Entry] : good air entry [No Respiratory Distress] : no respiratory distress  [Soft] : abdomen soft [Non Tender] : non-tender [No Masses/organomegaly] : no masses/organomegaly [Normal Bowel Sounds] : normal bowel sounds [Normal Gait] : normal gait [No Edema] : no edema [No Cyanosis] : no cyanosis [No Clubbing] : no clubbing [No Varicosities] : no varicosities [No Rash] : no rash [No Skin Lesions] : no skin lesions [Moves all extremities] : moves all extremities [No Focal Deficits] : no focal deficits [Normal Speech] : normal speech [Alert and Oriented] : alert and oriented [Normal memory] : normal memory [General Appearance - Well Developed] : well developed [Well Groomed] : well groomed [General Appearance - Well Nourished] : well nourished [General Appearance - In No Acute Distress] : no acute distress [Normal Conjunctiva] : the conjunctiva exhibited no abnormalities [Eyelids - No Xanthelasma] : the eyelids demonstrated no xanthelasmas [Normal Oral Mucosa] : normal oral mucosa [No Oral Pallor] : no oral pallor [No Oral Cyanosis] : no oral cyanosis [Normal Jugular Venous A Waves Present] : normal jugular venous A waves present [Normal Jugular Venous V Waves Present] : normal jugular venous V waves present [No Jugular Venous Garcia A Waves] : no jugular venous garcia A waves [Abdomen Soft] : soft [Abdomen Tenderness] : non-tender [Abdomen Mass (___ Cm)] : no abdominal mass palpated [Abnormal Walk] : normal gait [Gait - Sufficient For Exercise Testing] : the gait was sufficient for exercise testing [Nail Clubbing] : no clubbing of the fingernails [Cyanosis, Localized] : no localized cyanosis [Petechial Hemorrhages (___cm)] : no petechial hemorrhages [Skin Color & Pigmentation] : normal skin color and pigmentation [] : no rash [No Venous Stasis] : no venous stasis [Skin Lesions] : no skin lesions [No Skin Ulcers] : no skin ulcer [No Xanthoma] : no  xanthoma was observed [Oriented To Time, Place, And Person] : oriented to person, place, and time [Affect] : the affect was normal [Mood] : the mood was normal [No Anxiety] : not feeling anxious [Normal Rhythm/Effort] : normal respiratory rhythm and effort [Clear Bilaterally] : the lungs were clear to auscultation bilaterally [Normal Rate] : normal [Rhythm Regular] : regular [Normal S1] : normal S1 [Normal S2] : normal S2 [No Gallop] : no gallop heard [II] : a grade 2 [I] : a grade 1 [Right Carotid Bruit] : no bruit heard over the right carotid [Left Carotid Bruit] : left carotid bruit heard [2+] : left 2+ [Bruit] : no bruit heard [___ +] : bilateral [unfilled]U+ pretibial pitting edema

## 2023-03-07 ENCOUNTER — RX RENEWAL (OUTPATIENT)
Age: 73
End: 2023-03-07

## 2023-04-20 ENCOUNTER — RX RENEWAL (OUTPATIENT)
Age: 73
End: 2023-04-20

## 2023-06-12 ENCOUNTER — APPOINTMENT (OUTPATIENT)
Dept: CARDIOLOGY | Facility: CLINIC | Age: 73
End: 2023-06-12
Payer: MEDICARE

## 2023-06-12 ENCOUNTER — NON-APPOINTMENT (OUTPATIENT)
Age: 73
End: 2023-06-12

## 2023-06-12 VITALS
HEIGHT: 59 IN | SYSTOLIC BLOOD PRESSURE: 119 MMHG | OXYGEN SATURATION: 97 % | HEART RATE: 70 BPM | DIASTOLIC BLOOD PRESSURE: 68 MMHG | WEIGHT: 119 LBS | BODY MASS INDEX: 23.99 KG/M2

## 2023-06-12 DIAGNOSIS — E78.5 HYPERLIPIDEMIA, UNSPECIFIED: ICD-10-CM

## 2023-06-12 PROCEDURE — 93000 ELECTROCARDIOGRAM COMPLETE: CPT

## 2023-06-12 PROCEDURE — 99214 OFFICE O/P EST MOD 30 MIN: CPT

## 2023-06-12 NOTE — DISCUSSION/SUMMARY
[EKG obtained to assist in diagnosis and management of assessed problem(s)] : EKG obtained to assist in diagnosis and management of assessed problem(s) [FreeTextEntry1] : 72 year woman with a history as listed presents for a followup visit. \par \par Andree is in no acute distress.  She is euvolemic on exam.  ECG is unchanged from previous, illustrates sinus rhythm with first degree AVB. Her blood pressure continues to be controlled.  She does not have symptoms consistent with angina.  She is relatively stable. \par \par Her PCI to the RCA and OM was in 9/2017. Given her anemia, increased risk of bleeding I will keep her off of Plavix. She will continue ASA 81mg Qday. Her testing  8/2021 was unremarkable.  \par She will continue with hematology, Rheum and get PRBCs as needed. She has completed Rituxan therapy. Continue folate and b12 oral supplementation. Last Aranesp infusion was about 6 months ago.   \par \par Her blood pressure is at goal.  She will continue  hydralazine 100mg Q12.  I will continue her Imdur  60mg q12, Coreg 25mg q12,  . She will continue with the Clonidine to 0.3mg patch Qweek. She will continue  procardia 30mg HS. She has not tolerated the ARBs because of hyperkalemia.  She did not tolerate the valtasa. \par \par She now is on Torsemide 20m QOD. She is on Jardiance.  \par \par She will continue with statin therapy to achieve maintain goal LDL<100 or ideally <70. She will continue Crestor 20mg HS.  Her goal LDL <70. She did not tolerate fish oil and the Vascepa was expensive. \par \par Her GRAEME eval in 2018 was negative.  She will try melatonin. She may have restless leg syndrome. \par \par Exercise and diet counseling was performed in order to reduce her future cardiovascular risk. \par She will followup with me in 6 months

## 2023-06-12 NOTE — CARDIOLOGY SUMMARY
[de-identified] : SR 1 st degree   [de-identified] : 4/30/18 nuc 7 MET neg spect\par 8/2021 pharm stress neg spect  [de-identified] : 6/2020 normal systolic LV function\par 8/2021 normal LV function. EF 64% mild diastolic dys [de-identified] : 9/2017 JACQUELINE to [place] VERDUGOA [de-identified] : 2018 mild plaque carotids\par 9/2021 lower ext edema: mild atherosclerosis.

## 2023-06-12 NOTE — HISTORY OF PRESENT ILLNESS
[FreeTextEntry1] : She is here for a followup visit. \par Since her last visit, she has been relatively doing well. She is still having trouble sleeping. daytime somnolence, and morning headaches. She states that she has jerking movements in her legs that wakes her up at night. She denies any dyspnea. She  denies any chest pain, PND, near syncope, syncope, strokelike symptoms,lower extremity edema. She denies any claudications.  She is compliant with her medications.

## 2023-06-12 NOTE — REASON FOR VISIT
[FreeTextEntry1] : 72 year old woman with a history of DM, HTN. HLD, anemia, CAD s/p [place] JACQUELINE to OM1 and mRCA secondary to NSTEMI on 9/19/17. \par She was initially seen at [facility] for symptoms of unstable anginal and was found to have a small NSTEMI. Her echo showed normal LV function. \par \par She had a EGD/colonoscopy that showed hemorrhoids, gastritis, gastric ulceration, hiatal hernia. She was placed on carafate and H2 blocker. Her GERD symptoms have improved. She is seeing the hematologist for iron infusion. \par She had a negative capsule study.\par she had an nuclear stress test on 4/30/18 which showed fair exercise tolerance with a hypertensive response; she had normal perfusion imaging. She had an echo on that was showed normal systolic LV function without any significant other findings, including no significant valvular disease. 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 She was started on Mycophenolate by nephrology at [place].  She was advised that the Cellcept is not working and  started on Rituximab. he feeling much better on the Rituximab for 2 dose. She is feeling much better off the Cellcept. \par She got a second opinion from Dr. Óscar Ibanez ([facility]) who thought she may have hemolytic anemia and was started on folic acid and Aranesp.

## 2023-06-18 NOTE — ED ADULT NURSE NOTE - CAS DISCH BELONGINGS RETURNED
----- Message from Rosi Mobley PA-C sent at 6/18/2023  4:06 PM CDT -----  POSITIVE STREP, negative COVID/FLU  Amoxicillin 500 mg BID x 10 days  
Called patient's father. Talked to Alexander. Explained results and recommendations. He verbalized understanding. Script sent to pharmacy of choice.   
Yes

## 2023-07-17 ENCOUNTER — EMERGENCY (EMERGENCY)
Facility: HOSPITAL | Age: 73
LOS: 1 days | Discharge: HOME | End: 2023-07-17
Attending: EMERGENCY MEDICINE | Admitting: EMERGENCY MEDICINE
Payer: MEDICARE

## 2023-07-17 VITALS
HEART RATE: 72 BPM | SYSTOLIC BLOOD PRESSURE: 112 MMHG | TEMPERATURE: 98 F | DIASTOLIC BLOOD PRESSURE: 62 MMHG | RESPIRATION RATE: 18 BRPM | OXYGEN SATURATION: 98 %

## 2023-07-17 VITALS
DIASTOLIC BLOOD PRESSURE: 68 MMHG | OXYGEN SATURATION: 97 % | TEMPERATURE: 98 F | HEART RATE: 71 BPM | WEIGHT: 115.08 LBS | RESPIRATION RATE: 18 BRPM | SYSTOLIC BLOOD PRESSURE: 108 MMHG

## 2023-07-17 DIAGNOSIS — Z98.890 OTHER SPECIFIED POSTPROCEDURAL STATES: Chronic | ICD-10-CM

## 2023-07-17 DIAGNOSIS — Z98.891 HISTORY OF UTERINE SCAR FROM PREVIOUS SURGERY: Chronic | ICD-10-CM

## 2023-07-17 DIAGNOSIS — Z98.49 CATARACT EXTRACTION STATUS, UNSPECIFIED EYE: Chronic | ICD-10-CM

## 2023-07-17 LAB
ALBUMIN SERPL ELPH-MCNC: 3.5 G/DL — SIGNIFICANT CHANGE UP (ref 3.3–5)
ALP SERPL-CCNC: 66 U/L — SIGNIFICANT CHANGE UP (ref 40–120)
ALT FLD-CCNC: 28 U/L — SIGNIFICANT CHANGE UP (ref 12–78)
ANION GAP SERPL CALC-SCNC: 6 MMOL/L — SIGNIFICANT CHANGE UP (ref 5–17)
AST SERPL-CCNC: 31 U/L — SIGNIFICANT CHANGE UP (ref 15–37)
BASOPHILS # BLD AUTO: 0.03 K/UL — SIGNIFICANT CHANGE UP (ref 0–0.2)
BASOPHILS NFR BLD AUTO: 0.3 % — SIGNIFICANT CHANGE UP (ref 0–2)
BILIRUB SERPL-MCNC: 0.4 MG/DL — SIGNIFICANT CHANGE UP (ref 0.2–1.2)
BUN SERPL-MCNC: 36 MG/DL — HIGH (ref 7–23)
CALCIUM SERPL-MCNC: 9.4 MG/DL — SIGNIFICANT CHANGE UP (ref 8.5–10.1)
CHLORIDE SERPL-SCNC: 106 MMOL/L — SIGNIFICANT CHANGE UP (ref 96–108)
CO2 SERPL-SCNC: 25 MMOL/L — SIGNIFICANT CHANGE UP (ref 22–31)
CREAT SERPL-MCNC: 1.8 MG/DL — HIGH (ref 0.5–1.3)
EGFR: 29 ML/MIN/1.73M2 — LOW
EOSINOPHIL # BLD AUTO: 0.09 K/UL — SIGNIFICANT CHANGE UP (ref 0–0.5)
EOSINOPHIL NFR BLD AUTO: 0.8 % — SIGNIFICANT CHANGE UP (ref 0–6)
GLUCOSE SERPL-MCNC: 235 MG/DL — HIGH (ref 70–99)
HCT VFR BLD CALC: 33.6 % — LOW (ref 34.5–45)
HGB BLD-MCNC: 10.9 G/DL — LOW (ref 11.5–15.5)
IMM GRANULOCYTES NFR BLD AUTO: 0.9 % — SIGNIFICANT CHANGE UP (ref 0–0.9)
LIDOCAIN IGE QN: 128 U/L — SIGNIFICANT CHANGE UP (ref 73–393)
LYMPHOCYTES # BLD AUTO: 3.73 K/UL — HIGH (ref 1–3.3)
LYMPHOCYTES # BLD AUTO: 32.4 % — SIGNIFICANT CHANGE UP (ref 13–44)
MCHC RBC-ENTMCNC: 30.6 PG — SIGNIFICANT CHANGE UP (ref 27–34)
MCHC RBC-ENTMCNC: 32.4 GM/DL — SIGNIFICANT CHANGE UP (ref 32–36)
MCV RBC AUTO: 94.4 FL — SIGNIFICANT CHANGE UP (ref 80–100)
MONOCYTES # BLD AUTO: 0.89 K/UL — SIGNIFICANT CHANGE UP (ref 0–0.9)
MONOCYTES NFR BLD AUTO: 7.7 % — SIGNIFICANT CHANGE UP (ref 2–14)
NEUTROPHILS # BLD AUTO: 6.66 K/UL — SIGNIFICANT CHANGE UP (ref 1.8–7.4)
NEUTROPHILS NFR BLD AUTO: 57.9 % — SIGNIFICANT CHANGE UP (ref 43–77)
NRBC # BLD: 0 /100 WBCS — SIGNIFICANT CHANGE UP (ref 0–0)
PLATELET # BLD AUTO: 248 K/UL — SIGNIFICANT CHANGE UP (ref 150–400)
POTASSIUM SERPL-MCNC: 4.3 MMOL/L — SIGNIFICANT CHANGE UP (ref 3.5–5.3)
POTASSIUM SERPL-SCNC: 4.3 MMOL/L — SIGNIFICANT CHANGE UP (ref 3.5–5.3)
PROT SERPL-MCNC: 7.8 G/DL — SIGNIFICANT CHANGE UP (ref 6–8.3)
RBC # BLD: 3.56 M/UL — LOW (ref 3.8–5.2)
RBC # FLD: 13.5 % — SIGNIFICANT CHANGE UP (ref 10.3–14.5)
SODIUM SERPL-SCNC: 137 MMOL/L — SIGNIFICANT CHANGE UP (ref 135–145)
TROPONIN I, HIGH SENSITIVITY RESULT: 8 NG/L — SIGNIFICANT CHANGE UP
WBC # BLD: 11.5 K/UL — HIGH (ref 3.8–10.5)
WBC # FLD AUTO: 11.5 K/UL — HIGH (ref 3.8–10.5)

## 2023-07-17 PROCEDURE — 85025 COMPLETE CBC W/AUTO DIFF WBC: CPT

## 2023-07-17 PROCEDURE — 99285 EMERGENCY DEPT VISIT HI MDM: CPT | Mod: GC

## 2023-07-17 PROCEDURE — 93005 ELECTROCARDIOGRAM TRACING: CPT

## 2023-07-17 PROCEDURE — 83690 ASSAY OF LIPASE: CPT

## 2023-07-17 PROCEDURE — 80053 COMPREHEN METABOLIC PANEL: CPT

## 2023-07-17 PROCEDURE — 96374 THER/PROPH/DIAG INJ IV PUSH: CPT

## 2023-07-17 PROCEDURE — 99285 EMERGENCY DEPT VISIT HI MDM: CPT | Mod: 25

## 2023-07-17 PROCEDURE — 84484 ASSAY OF TROPONIN QUANT: CPT

## 2023-07-17 PROCEDURE — 70450 CT HEAD/BRAIN W/O DYE: CPT | Mod: MA

## 2023-07-17 PROCEDURE — 70450 CT HEAD/BRAIN W/O DYE: CPT | Mod: 26,MA

## 2023-07-17 PROCEDURE — 36415 COLL VENOUS BLD VENIPUNCTURE: CPT

## 2023-07-17 PROCEDURE — 93010 ELECTROCARDIOGRAM REPORT: CPT

## 2023-07-17 PROCEDURE — 96375 TX/PRO/DX INJ NEW DRUG ADDON: CPT

## 2023-07-17 RX ORDER — METOCLOPRAMIDE HCL 10 MG
10 TABLET ORAL ONCE
Refills: 0 | Status: COMPLETED | OUTPATIENT
Start: 2023-07-17 | End: 2023-07-17

## 2023-07-17 RX ORDER — KETOROLAC TROMETHAMINE 30 MG/ML
15 SYRINGE (ML) INJECTION ONCE
Refills: 0 | Status: DISCONTINUED | OUTPATIENT
Start: 2023-07-17 | End: 2023-07-17

## 2023-07-17 RX ORDER — DIAZEPAM 5 MG
2.5 TABLET ORAL ONCE
Refills: 0 | Status: DISCONTINUED | OUTPATIENT
Start: 2023-07-17 | End: 2023-07-17

## 2023-07-17 RX ORDER — MECLIZINE HCL 12.5 MG
25 TABLET ORAL ONCE
Refills: 0 | Status: COMPLETED | OUTPATIENT
Start: 2023-07-17 | End: 2023-07-17

## 2023-07-17 RX ORDER — SODIUM CHLORIDE 9 MG/ML
1000 INJECTION INTRAMUSCULAR; INTRAVENOUS; SUBCUTANEOUS ONCE
Refills: 0 | Status: COMPLETED | OUTPATIENT
Start: 2023-07-17 | End: 2023-07-17

## 2023-07-17 RX ORDER — MECLIZINE HCL 12.5 MG
1 TABLET ORAL
Qty: 15 | Refills: 0
Start: 2023-07-17 | End: 2023-07-21

## 2023-07-17 RX ADMIN — Medication 15 MILLIGRAM(S): at 13:00

## 2023-07-17 RX ADMIN — Medication 15 MILLIGRAM(S): at 12:14

## 2023-07-17 RX ADMIN — Medication 104 MILLIGRAM(S): at 12:14

## 2023-07-17 RX ADMIN — Medication 25 MILLIGRAM(S): at 10:48

## 2023-07-17 RX ADMIN — SODIUM CHLORIDE 1000 MILLILITER(S): 9 INJECTION INTRAMUSCULAR; INTRAVENOUS; SUBCUTANEOUS at 10:45

## 2023-07-17 RX ADMIN — Medication 125 MILLIGRAM(S): at 10:45

## 2023-07-17 RX ADMIN — Medication 2.5 MILLIGRAM(S): at 12:14

## 2023-07-17 NOTE — ED ADULT TRIAGE NOTE - CHIEF COMPLAINT QUOTE
c/o dizziness heaviness in her head headache x 1 week c/o dizziness heaviness in her head headache x 1 week weakness

## 2023-07-17 NOTE — ED PROVIDER NOTE - NSICDXPASTSURGICALHX_GEN_ALL_CORE_FT
PAST SURGICAL HISTORY:  History of colonoscopy     S/P carpal tunnel release     S/P  section x 2    Status post cataract extraction and insertion of intraocular lens, unspecified laterality

## 2023-07-17 NOTE — ED PROVIDER NOTE - PATIENT PORTAL LINK FT
You can access the FollowMyHealth Patient Portal offered by [facility] by registering at the following website: [email or web address]. By joining [employer]’s FollowMyHealth portal, you will also be able to view your health information using other applications (apps) compatible with our system.

## 2023-07-17 NOTE — ED PROVIDER NOTE - CLINICAL SUMMARY MEDICAL DECISION MAKING FREE TEXT BOX
Pt is a 73 y/o F PMH HTN, DM, CKD Stage III, CAD s/p stent x2, gout and anemia presenting with worsening dizziness lasting one week after a flight. Pt tried meclizine and tylenol, which did not improve her symptoms. Pt states symptoms are exacerbated by positional changes. Denies headaches, tinnitus, photophobia and phonophobia. Dizziness work-up includes CBC, CMP, troponins, CT head/neck, CTA, as well as treatment with meclizine and methyprednisone. Patient is a 72-year-old female who presents to the emergency room with a chief complaint of dizziness.  Past medical history of diabetes hypertension CAD status post stent x2 hypothyroidism CKD history of gout history of anemia.  She reports that she was in [facility], and returned home on July 8.  The flight was prolonged.  That following Sunday she developed head pressure with dizziness.  Dizziness seems to resolve at rest and worsens with positional changes.  She attempted 48 hours of meclizine 3 times daily without improvement in symptoms and has been taking Tylenol nightly without improvement in her head pressure.  Symptoms have continued.  She has not followed up with a neurologist for this.  Denies any prior history of similar episodes.  Denies headache but describes it as a head pressure.  Denies visual changes.  Denies tinnitus.  Denies photophobia and photophobia.  Denies fevers chills nausea vomiting chest pain shortness of breath or abdominal pain.  Denies extremity numbness or weakness.  Denies ear pain or sore throat.    On exam pt lying in bed NAD, NCAT, PERRL, EOMI, heart RR, lungs CTA, abd soft NT/ND. CN II-XII intact, NIH stroke scale 0. No nystagmus at this time. Patient presenting to the emergency room with positional dizziness and head pressure. Differential includes but not limited to possible benign positional vertigo versus sinusitis versus possible atypical stroke presentation.  Symptoms have been ongoing for over a week patient would not be considered as a tenecteplase candidate.  Should also not be considered at clot retrieval candidate.  Will obtain screening labs CT imaging of the head we will treat for possible vertigo and sinusitis and will monitor.  Initially patient had no improvement after meclizine and Solu-Medrol.  Toradol Valium and Reglan now ordered.  Still symptomatic spoke with neurology if patient remains symptomatic will admit for possible MRI.  Shortly after speaking with neurology head pressure and dizziness resolved.  Patient up and ambulating without symptoms.  Offered admission she was made aware that this could have been an atypical CVA presentation.  At this time patient refusing would like to be discharged with outpatient neurology follow-up.  Advised to take an aspirin 81 mg daily which she already takes.  Will discharge with p.o. prednisone meclizine and Augmentin for possible sinusitis causing symptoms.  Patient advised to monitor her sugars closely.

## 2023-07-17 NOTE — ED PROVIDER NOTE - DIFFERENTIAL DIAGNOSIS
Patient presenting to the emergency room with positional dizziness and head pressure.  Differential includes but not limited to possible benign positional vertigo versus sinusitis versus possible atypical stroke presentation.  Symptoms have been ongoing for over a week patient would not be considered as a tenecteplase candidate.  Should also not be considered at clot retrieval candidate.  Will obtain screening labs CT imaging of the head we will treat for possible vertigo and sinusitis and will monitor. Differential Diagnosis

## 2023-07-17 NOTE — ED PROVIDER NOTE - NS ED ROS FT
CONSTITUTIONAL: endorses weakness. denies fever, chills, fatigue  HEENT: endorses dizziness. denies headache, changes in vision  SKIN: denies new lesions, rash  CARDIOVASCULAR: denies chest pain, chest pressure, palpitations  RESPIRATORY: denies SOB  GASTROINTESTINAL: denies nausea, vomiting, diarrhea, abdominal pain  NEUROLOGICAL: denies numbness, headache, focal weakness  MUSCULOSKELETAL: denies new joint pain, muscle aches

## 2023-07-17 NOTE — ED ADULT NURSE NOTE - ALCOHOL PRE SCREEN (AUDIT - C)
Notified Rico Mccloud (on call for Dr. Sharona Pierson) of new inpatient palliative consult. Statement Selected

## 2023-07-17 NOTE — ED PROVIDER NOTE - CARE PROVIDER_API CALL
Sahara Ventura  Neurology  [address] [place] [address]  Phone: [phone]  Fax: [phone]  Follow Up Time:

## 2023-07-17 NOTE — ED ADULT NURSE NOTE - NSFALLRISKINTERV_ED_ALL_ED

## 2023-07-17 NOTE — ED PROVIDER NOTE - NSICDXPASTMEDICALHX_GEN_ALL_CORE_FT
PAST MEDICAL HISTORY:  Anemia     CKD (chronic kidney disease) stage 3, GFR 30-59 ml/min     Diabetes mellitus     Gout     Hypertension

## 2023-07-17 NOTE — ED PROVIDER NOTE - PHYSICAL EXAMINATION
GENERAL: In mild distress, lying in bed   HEAD:  Atraumatic, normocephalic  EYES: + left-shrestha nystagmus, EOMI, PERRLA  ENT: Moist mucous membranes  HEART: RRR, +S1/S2, no murmurs, rubs, or gallops  LUNGS: CTA b/l no crackles, wheezing, or rhonchi  ABDOMEN: Soft, NT/ND, +BS  EXTREMITIES: 2+ peripheral pulses. No clubbing, cyanosis, or edema  NERVOUS SYSTEM:  A&Ox3, no focal deficits   SKIN: Warm and dry

## 2023-07-17 NOTE — ED PROVIDER NOTE - NS ED ATTENDING STATEMENT MOD
I have seen and examined this patient and fully participated in the care of this patient as the teaching attending.  The service was shared with the MERI.  I reviewed and verified the documentation and independently performed the documented: Attending with

## 2023-07-17 NOTE — ED PROVIDER NOTE - NSFOLLOWUPINSTRUCTIONS_ED_ALL_ED_FT
Return to the ED for any new or worsening symptoms  Take your medication as prescribed  Meclizine per label instructions as needed for dizziness  Augmentin 1 tab 2 times a day  Prednisone per label instructions daily monitor your sugars closely as this will likely cause your sugars to elevate  Continue your Aspirin 81 mg daily  Follow up with neurology call today to schedule follow up   Advance activity as tolerated         Benign Positional Vertigo  Vertigo is the feeling that you or your surroundings are moving when they are not. Benign positional vertigo is the most common form of vertigo. This is usually a harmless condition (benign). This condition is positional. This means that symptoms are triggered by certain movements and positions.    This condition can be dangerous if it occurs while you are doing something that could cause harm to yourself or others. This includes activities such as driving or operating machinery.    What are the causes?  The inner ear has fluid-filled canals that help your brain sense movement and balance. When the fluid moves, the brain receives messages about your body's position.    With benign positional vertigo, calcium crystals in the inner ear break free and disturb the inner ear area. This causes your brain to receive confusing messages about your body's position.    What increases the risk?  You are more likely to develop this condition if:  You are a woman.  You are 50 years of age or older.  You have recently had a head injury.  You have an inner ear disease.  What are the signs or symptoms?  Symptoms of this condition usually happen when you move your head or your eyes in different directions. Symptoms may start suddenly and usually last for less than a minute. They include:  Loss of balance and falling.  Feeling like you are spinning or moving.  Feeling like your surroundings are spinning or moving.  Nausea and vomiting.  Blurred vision.  Dizziness.  Involuntary eye movement (nystagmus).  Symptoms can be mild and cause only minor problems, or they can be severe and interfere with daily life. Episodes of benign positional vertigo may return (recur) over time. Symptoms may also improve over time.    How is this diagnosed?  This condition may be diagnosed based on:  Your medical history.  A physical exam of the head, neck, and ears.  Positional tests to check for or stimulate vertigo. You may be asked to turn your head and change positions, such as going from sitting to lying down. A health care provider will watch for symptoms of vertigo.  You may be referred to a health care provider who specializes in ear, nose, and throat problems (ENT or otolaryngologist) or a provider who specializes in disorders of the nervous system (neurologist).    How is this treated?  Medical person standing behind sitting patient using both hands to move patient's head to another position.  This condition may be treated in a session in which your health care provider moves your head in specific positions to help the displaced crystals in your inner ear move. Treatment for this condition may take several sessions. Surgery may be needed in severe cases, but this is rare.    In some cases, benign positional vertigo may resolve on its own in 2–4 weeks.    Follow these instructions at home:  Safety    Move slowly. Avoid sudden body or head movements or certain positions, as told by your health care provider.  Avoid driving or operating machinery until your health care provider says it is safe.  Avoid doing any tasks that would be dangerous to you or others if vertigo occurs.  If you have trouble walking or keeping your balance, try using a cane for stability. If you feel dizzy or unstable, sit down right away.  Return to your normal activities as told by your health care provider. Ask your health care provider what activities are safe for you.  General instructions    Take over-the-counter and prescription medicines only as told by your health care provider.  Drink enough fluid to keep your urine pale yellow.  Keep all follow-up visits. This is important.  Contact a health care provider if:  You have a fever.  Your condition gets worse or you develop new symptoms.  Your family or friends notice any behavioral changes.  You have nausea or vomiting that gets worse.  You have numbness or a prickling and tingling sensation.  Get help right away if you:  Have difficulty speaking or moving.  Are always dizzy or faint.  Develop severe headaches.  Have weakness in your legs or arms.  Have changes in your hearing or vision.  Develop a stiff neck.  Develop sensitivity to light.  These symptoms may represent a serious problem that is an emergency. Do not wait to see if the symptoms will go away. Get medical help right away. Call your local emergency services (911 in the [place] Do not drive yourself to the hospital.    Summary  Vertigo is the feeling that you or your surroundings are moving when they are not. Benign positional vertigo is the most common form of vertigo.  This condition is caused by calcium crystals in the inner ear that become displaced. This causes a disturbance in an area of the inner ear that helps your brain sense movement and balance.  Symptoms include loss of balance and falling, feeling that you or your surroundings are moving, nausea and vomiting, and blurred vision.  This condition can be diagnosed based on symptoms, a physical exam, and positional tests.  Follow safety instructions as told by your health care provider and keep all follow-up visits. This is important.  This information is not intended to replace advice given to you by your health care provider. Make sure you discuss any questions you have with your health care provider.

## 2023-07-21 ENCOUNTER — APPOINTMENT (OUTPATIENT)
Dept: OBGYN | Facility: CLINIC | Age: 73
End: 2023-07-21
Payer: MEDICARE

## 2023-07-21 VITALS
HEIGHT: 59 IN | WEIGHT: 117 LBS | SYSTOLIC BLOOD PRESSURE: 121 MMHG | BODY MASS INDEX: 23.59 KG/M2 | DIASTOLIC BLOOD PRESSURE: 80 MMHG

## 2023-07-21 DIAGNOSIS — Z01.419 ENCOUNTER FOR GYNECOLOGICAL EXAMINATION (GENERAL) (ROUTINE) W/OUT ABNORMAL FINDINGS: ICD-10-CM

## 2023-07-21 DIAGNOSIS — Z13.820 ENCOUNTER FOR SCREENING FOR OSTEOPOROSIS: ICD-10-CM

## 2023-07-21 PROCEDURE — 99387 INIT PM E/M NEW PAT 65+ YRS: CPT | Mod: GY

## 2023-07-22 LAB — HPV HIGH+LOW RISK DNA PNL CVX: NOT DETECTED

## 2023-07-25 ENCOUNTER — RX RENEWAL (OUTPATIENT)
Age: 73
End: 2023-07-25

## 2023-07-26 LAB — CYTOLOGY CVX/VAG DOC THIN PREP: NORMAL

## 2023-07-27 ENCOUNTER — NON-APPOINTMENT (OUTPATIENT)
Age: 73
End: 2023-07-27

## 2023-09-07 PROBLEM — M10.9 GOUT, UNSPECIFIED: Chronic | Status: ACTIVE | Noted: 2023-07-17

## 2023-09-07 PROBLEM — D64.9 ANEMIA, UNSPECIFIED: Chronic | Status: ACTIVE | Noted: 2023-07-17

## 2023-09-13 ENCOUNTER — APPOINTMENT (OUTPATIENT)
Dept: MRI IMAGING | Facility: CLINIC | Age: 73
End: 2023-09-13
Payer: MEDICARE

## 2023-09-13 ENCOUNTER — OUTPATIENT (OUTPATIENT)
Dept: OUTPATIENT SERVICES | Facility: HOSPITAL | Age: 73
LOS: 1 days | End: 2023-09-13
Payer: MEDICARE

## 2023-09-13 DIAGNOSIS — Z98.890 OTHER SPECIFIED POSTPROCEDURAL STATES: Chronic | ICD-10-CM

## 2023-09-13 DIAGNOSIS — Z98.49 CATARACT EXTRACTION STATUS, UNSPECIFIED EYE: Chronic | ICD-10-CM

## 2023-09-13 DIAGNOSIS — Z98.891 HISTORY OF UTERINE SCAR FROM PREVIOUS SURGERY: Chronic | ICD-10-CM

## 2023-09-13 DIAGNOSIS — N94.89 OTHER SPECIFIED CONDITIONS ASSOCIATED WITH FEMALE GENITAL ORGANS AND MENSTRUAL CYCLE: ICD-10-CM

## 2023-09-13 PROCEDURE — A9585: CPT

## 2023-09-13 PROCEDURE — 72197 MRI PELVIS W/O & W/DYE: CPT | Mod: 26,MH

## 2023-09-13 PROCEDURE — 72197 MRI PELVIS W/O & W/DYE: CPT | Mod: MH

## 2023-09-22 ENCOUNTER — NON-APPOINTMENT (OUTPATIENT)
Age: 73
End: 2023-09-22

## 2023-11-27 ENCOUNTER — RX RENEWAL (OUTPATIENT)
Age: 73
End: 2023-11-27

## 2023-12-13 DIAGNOSIS — N83.209 UNSPECIFIED OVARIAN CYST, UNSPECIFIED SIDE: ICD-10-CM

## 2023-12-19 ENCOUNTER — TRANSCRIPTION ENCOUNTER (OUTPATIENT)
Age: 73
End: 2023-12-19

## 2023-12-20 ENCOUNTER — APPOINTMENT (OUTPATIENT)
Dept: CARDIOLOGY | Facility: CLINIC | Age: 73
End: 2023-12-20
Payer: MEDICARE

## 2023-12-20 VITALS
DIASTOLIC BLOOD PRESSURE: 72 MMHG | OXYGEN SATURATION: 99 % | SYSTOLIC BLOOD PRESSURE: 136 MMHG | HEIGHT: 59 IN | HEART RATE: 78 BPM | BODY MASS INDEX: 23.18 KG/M2 | WEIGHT: 115 LBS

## 2023-12-20 DIAGNOSIS — I10 ESSENTIAL (PRIMARY) HYPERTENSION: ICD-10-CM

## 2023-12-20 PROCEDURE — 93000 ELECTROCARDIOGRAM COMPLETE: CPT

## 2023-12-20 PROCEDURE — 99214 OFFICE O/P EST MOD 30 MIN: CPT

## 2023-12-20 RX ORDER — NIFEDIPINE 30 MG/1
30 TABLET, EXTENDED RELEASE ORAL EVERY OTHER DAY
Qty: 90 | Refills: 3 | Status: ACTIVE | COMMUNITY
Start: 2022-06-28 | End: 1900-01-01

## 2023-12-20 NOTE — REASON FOR VISIT
[FreeTextEntry1] : 72 year old woman with a history of DM, HTN. HLD, anemia, CAD s/p [place] JACQUELINE to OM1 and mRCA secondary to NSTEMI on 9/19/17. \par  She was initially seen at [facility] for symptoms of unstable anginal and was found to have a small NSTEMI. Her echo showed normal LV function. \par  \par  She had a EGD/colonoscopy that showed hemorrhoids, gastritis, gastric ulceration, hiatal hernia. She was placed on carafate and H2 blocker. Her GERD symptoms have improved. She is seeing the hematologist for iron infusion. \par  She had a negative capsule study.\par  she had an nuclear stress test on 4/30/18 which showed fair exercise tolerance with a hypertensive response; she had normal perfusion imaging. She had an echo on that was showed normal systolic LV function without any significant other findings, including no significant valvular disease. 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 She was started on Mycophenolate by nephrology at [place].  She was advised that the Cellcept is not working and  started on Rituximab. he feeling much better on the Rituximab for 2 dose. She is feeling much better off the Cellcept. \par  She got a second opinion from Dr. Óscar Ibanez ([facility]) who thought she may have hemolytic anemia and was started on folic acid and Aranesp.

## 2023-12-20 NOTE — PHYSICAL EXAM
[Well Developed] : well developed [Well Nourished] : well nourished [No Acute Distress] : no acute distress [Normal Venous Pressure] : normal venous pressure [No Carotid Bruit] : no carotid bruit [Normal S1, S2] : normal S1, S2 [No Murmur] : no murmur [No Rub] : no rub [No Pitting Edema] : no pitting edema present [No Abnormalities] : the abdominal aorta was not enlarged and no bruit was heard [Clear Lung Fields] : clear lung fields [Good Air Entry] : good air entry [No Respiratory Distress] : no respiratory distress  [Soft] : abdomen soft [Non Tender] : non-tender [No Masses/organomegaly] : no masses/organomegaly [Normal Bowel Sounds] : normal bowel sounds [Normal Gait] : normal gait [No Edema] : no edema [No Cyanosis] : no cyanosis [No Clubbing] : no clubbing [No Varicosities] : no varicosities [No Rash] : no rash [No Skin Lesions] : no skin lesions [Moves all extremities] : moves all extremities [No Focal Deficits] : no focal deficits [Normal Speech] : normal speech [Alert and Oriented] : alert and oriented [Normal memory] : normal memory [General Appearance - Well Developed] : well developed [Well Groomed] : well groomed [General Appearance - Well Nourished] : well nourished [General Appearance - In No Acute Distress] : no acute distress [Normal Conjunctiva] : the conjunctiva exhibited no abnormalities [Eyelids - No Xanthelasma] : the eyelids demonstrated no xanthelasmas [Normal Oral Mucosa] : normal oral mucosa [No Oral Pallor] : no oral pallor [No Oral Cyanosis] : no oral cyanosis [Normal Jugular Venous A Waves Present] : normal jugular venous A waves present [Normal Jugular Venous V Waves Present] : normal jugular venous V waves present [No Jugular Venous Garcia A Waves] : no jugular venous garcia A waves [Abdomen Soft] : soft [Abdomen Tenderness] : non-tender [Abdomen Mass (___ Cm)] : no abdominal mass palpated [Abnormal Walk] : normal gait [Gait - Sufficient For Exercise Testing] : the gait was sufficient for exercise testing [Nail Clubbing] : no clubbing of the fingernails [Cyanosis, Localized] : no localized cyanosis [Petechial Hemorrhages (___cm)] : no petechial hemorrhages [Skin Color & Pigmentation] : normal skin color and pigmentation [] : no rash [No Venous Stasis] : no venous stasis [Skin Lesions] : no skin lesions [No Skin Ulcers] : no skin ulcer [No Xanthoma] : no  xanthoma was observed [Oriented To Time, Place, And Person] : oriented to person, place, and time [Affect] : the affect was normal [Mood] : the mood was normal [No Anxiety] : not feeling anxious [Normal Rhythm/Effort] : normal respiratory rhythm and effort [Clear Bilaterally] : the lungs were clear to auscultation bilaterally [Normal Rate] : normal [Rhythm Regular] : regular [Normal S1] : normal S1 [Normal S2] : normal S2 [No Gallop] : no gallop heard [II] : a grade 2 [I] : a grade 1 [2+] : left 2+ [___ +] : bilateral [unfilled]U+ pretibial pitting edema [Left Carotid Bruit] : no bruit heard over the left carotid [Right Carotid Bruit] : no bruit heard over the right carotid [Bruit] : no bruit heard

## 2023-12-20 NOTE — DISCUSSION/SUMMARY
[FreeTextEntry1] : 72 year woman with a history as listed presents for a followup visit.   Andree is in no acute distress.  She is euvolemic on exam.  ECG is unchanged from previous, illustrates sinus rhythm with first degree AVB. Her blood pressure continues to be controlled.  She does not have symptoms consistent with angina.  She is relatively stable.   Her PCI to the RCA and OM was in 9/2017. Given her anemia, increased risk of bleeding I will keep her off of Plavix. She will continue ASA 81mg Qday. Her testing  8/2021 was unremarkable.   She will continue with hematology, Rheum and get PRBCs as needed. She has completed Rituxan therapy. Continue folate and b12 oral supplementation. Last Aranesp infusion was about 6 months ago.     Her blood pressure is at goal.  She will continue  hydralazine 100mg Q12.  I will continue her Imdur  60mg q12, Coreg 25mg q12,  . She will continue with the Clonidine to 0.3mg patch Qweek. She will continue  procardia 30mg every other day.  She has not tolerated the ARBs because of hyperkalemia.  She did not tolerate the valtasa.   She now is on Torsemide 20m QOD. She is on Jardiance.    She will continue with statin therapy to achieve maintain goal LDL<100 or ideally <70. She will continue Crestor 20mg HS.  Her goal LDL <70. She did not tolerate fish oil and the Vascepa was expensive.   Her GRAEME eval in 2018 was negative.  She will try melatonin. She may have restless leg syndrome.   Exercise and diet counseling was performed in order to reduce her future cardiovascular risk.  She will followup with me in 6 months  [EKG obtained to assist in diagnosis and management of assessed problem(s)] : EKG obtained to assist in diagnosis and management of assessed problem(s)

## 2023-12-20 NOTE — CARDIOLOGY SUMMARY
[de-identified] : Sinus Rhythm -First degree A-V block  Low voltage in limb leads. -Nonspecific T-abnormality. [de-identified] : 4/30/18 nuc 7 MET neg spect\par  8/2021 pharm stress neg spect  [de-identified] : 6/2020 normal systolic LV function\par  8/2021 normal LV function. EF 64% mild diastolic dys [de-identified] : 9/2017 JACQUELINE to [place] VERDUGOA [de-identified] : 2018 mild plaque carotids\par  9/2021 lower ext edema: mild atherosclerosis.

## 2023-12-20 NOTE — HISTORY OF PRESENT ILLNESS
[FreeTextEntry1] : She is here for a followup visit.  Since her last visit, she has been relatively doing well.  She denies any dyspnea. She  denies any chest pain, PND, near syncope, syncope, stroke like symptoms, lower extremity edema. She denies any claudication. She is intermittently getting sleep. She is relatively sedentary..  She is compliant with her medications.   Though she is taking Procardia only 3 timess  a week.  She is planning on going to [facility] soon.

## 2023-12-26 NOTE — ED PROVIDER NOTE - OBJECTIVE STATEMENT
Pt is a 72y female PMHx HTN, T2DM, CKD, CAD s/p stent x2, gout and anemia presenting with dizziness for the past week. Pt states symptoms started after a long flight and attributed to jet lag. Pt tried meclizine and tylenol with no alleviation of her symptoms. Symptoms have progressively worsened and are associated with a feeling of "heaviness" in her head. Dizziness is exacerbated with positional changes and are decreased at rest. Pt also endorses weakness. Pt denies n/v, tinnitus, syncope, photophobia and phonophobia.
Him/He

## 2024-01-04 ENCOUNTER — APPOINTMENT (OUTPATIENT)
Dept: ULTRASOUND IMAGING | Facility: CLINIC | Age: 74
End: 2024-01-04
Payer: MEDICARE

## 2024-01-04 ENCOUNTER — OUTPATIENT (OUTPATIENT)
Dept: OUTPATIENT SERVICES | Facility: HOSPITAL | Age: 74
LOS: 1 days | End: 2024-01-04
Payer: MEDICARE

## 2024-01-04 DIAGNOSIS — Z98.890 OTHER SPECIFIED POSTPROCEDURAL STATES: Chronic | ICD-10-CM

## 2024-01-04 DIAGNOSIS — N83.209 UNSPECIFIED OVARIAN CYST, UNSPECIFIED SIDE: ICD-10-CM

## 2024-01-04 DIAGNOSIS — Z98.49 CATARACT EXTRACTION STATUS, UNSPECIFIED EYE: Chronic | ICD-10-CM

## 2024-01-04 DIAGNOSIS — Z98.891 HISTORY OF UTERINE SCAR FROM PREVIOUS SURGERY: Chronic | ICD-10-CM

## 2024-01-04 PROCEDURE — 76830 TRANSVAGINAL US NON-OB: CPT | Mod: 26

## 2024-01-04 PROCEDURE — 76830 TRANSVAGINAL US NON-OB: CPT

## 2024-01-12 DIAGNOSIS — Z87.42 PERSONAL HISTORY OF OTHER DISEASES OF THE FEMALE GENITAL TRACT: ICD-10-CM

## 2024-01-29 ENCOUNTER — RX RENEWAL (OUTPATIENT)
Age: 74
End: 2024-01-29

## 2024-02-02 ENCOUNTER — RX RENEWAL (OUTPATIENT)
Age: 74
End: 2024-02-02

## 2024-02-02 RX ORDER — CARVEDILOL 25 MG/1
25 TABLET, FILM COATED ORAL
Qty: 180 | Refills: 3 | Status: ACTIVE | COMMUNITY
Start: 2017-09-26

## 2024-02-28 RX ORDER — HYDRALAZINE HYDROCHLORIDE 100 MG/1
100 TABLET ORAL
Qty: 180 | Refills: 2 | Status: ACTIVE | COMMUNITY
Start: 2018-03-06

## 2024-03-22 ENCOUNTER — RX RENEWAL (OUTPATIENT)
Age: 74
End: 2024-03-22

## 2024-03-24 RX ORDER — PANTOPRAZOLE 40 MG/1
40 TABLET, DELAYED RELEASE ORAL DAILY
Qty: 90 | Refills: 2 | Status: ACTIVE | COMMUNITY
Start: 2018-05-03

## 2024-04-01 DIAGNOSIS — Z12.39 ENCOUNTER FOR OTHER SCREENING FOR MALIGNANT NEOPLASM OF BREAST: ICD-10-CM

## 2024-04-06 ENCOUNTER — NON-APPOINTMENT (OUTPATIENT)
Age: 74
End: 2024-04-06

## 2024-04-25 ENCOUNTER — RX RENEWAL (OUTPATIENT)
Age: 74
End: 2024-04-25

## 2024-04-25 RX ORDER — CLONIDINE 0.3 MG/24H
0.3 PATCH, EXTENDED RELEASE TRANSDERMAL
Qty: 12 | Refills: 3 | Status: ACTIVE | COMMUNITY
Start: 2018-11-30 | End: 1900-01-01

## 2024-05-13 ENCOUNTER — RX RENEWAL (OUTPATIENT)
Age: 74
End: 2024-05-13

## 2024-05-13 RX ORDER — ISOSORBIDE MONONITRATE 60 MG/1
60 TABLET, EXTENDED RELEASE ORAL
Qty: 180 | Refills: 3 | Status: ACTIVE | COMMUNITY
Start: 2018-04-10 | End: 1900-01-01

## 2024-07-01 ENCOUNTER — APPOINTMENT (OUTPATIENT)
Dept: CARDIOLOGY | Facility: CLINIC | Age: 74
End: 2024-07-01
Payer: MEDICARE

## 2024-07-01 VITALS
BODY MASS INDEX: 22.78 KG/M2 | HEART RATE: 73 BPM | DIASTOLIC BLOOD PRESSURE: 72 MMHG | OXYGEN SATURATION: 96 % | SYSTOLIC BLOOD PRESSURE: 144 MMHG | WEIGHT: 113 LBS | HEIGHT: 59 IN

## 2024-07-01 DIAGNOSIS — I25.10 ATHEROSCLEROTIC HEART DISEASE OF NATIVE CORONARY ARTERY W/OUT ANGINA PECTORIS: ICD-10-CM

## 2024-07-01 DIAGNOSIS — R42 DIZZINESS AND GIDDINESS: ICD-10-CM

## 2024-07-01 PROCEDURE — 93000 ELECTROCARDIOGRAM COMPLETE: CPT

## 2024-07-01 PROCEDURE — G2211 COMPLEX E/M VISIT ADD ON: CPT

## 2024-07-01 PROCEDURE — 99214 OFFICE O/P EST MOD 30 MIN: CPT

## 2024-07-15 ENCOUNTER — APPOINTMENT (OUTPATIENT)
Dept: CARDIOLOGY | Facility: CLINIC | Age: 74
End: 2024-07-15
Payer: MEDICARE

## 2024-07-15 PROCEDURE — 93306 TTE W/DOPPLER COMPLETE: CPT

## 2024-07-15 PROCEDURE — 93880 EXTRACRANIAL BILAT STUDY: CPT

## 2024-09-10 ENCOUNTER — APPOINTMENT (OUTPATIENT)
Dept: OBGYN | Facility: CLINIC | Age: 74
End: 2024-09-10
Payer: MEDICARE

## 2024-09-10 VITALS
DIASTOLIC BLOOD PRESSURE: 60 MMHG | SYSTOLIC BLOOD PRESSURE: 126 MMHG | TEMPERATURE: 96.9 F | WEIGHT: 113 LBS | HEIGHT: 59 IN | HEART RATE: 76 BPM | OXYGEN SATURATION: 98 % | BODY MASS INDEX: 22.78 KG/M2

## 2024-09-10 DIAGNOSIS — Z01.419 ENCOUNTER FOR GYNECOLOGICAL EXAMINATION (GENERAL) (ROUTINE) W/OUT ABNORMAL FINDINGS: ICD-10-CM

## 2024-09-10 PROCEDURE — G0101: CPT

## 2024-09-20 ENCOUNTER — NON-APPOINTMENT (OUTPATIENT)
Age: 74
End: 2024-09-20

## 2024-09-20 ENCOUNTER — OUTPATIENT (OUTPATIENT)
Dept: OUTPATIENT SERVICES | Facility: HOSPITAL | Age: 74
LOS: 1 days | End: 2024-09-20
Payer: MEDICARE

## 2024-09-20 ENCOUNTER — APPOINTMENT (OUTPATIENT)
Dept: ULTRASOUND IMAGING | Facility: CLINIC | Age: 74
End: 2024-09-20
Payer: MEDICARE

## 2024-09-20 ENCOUNTER — RESULT REVIEW (OUTPATIENT)
Age: 74
End: 2024-09-20

## 2024-09-20 DIAGNOSIS — Z98.49 CATARACT EXTRACTION STATUS, UNSPECIFIED EYE: Chronic | ICD-10-CM

## 2024-09-20 DIAGNOSIS — Z98.890 OTHER SPECIFIED POSTPROCEDURAL STATES: Chronic | ICD-10-CM

## 2024-09-20 DIAGNOSIS — Z01.419 ENCOUNTER FOR GYNECOLOGICAL EXAMINATION (GENERAL) (ROUTINE) WITHOUT ABNORMAL FINDINGS: ICD-10-CM

## 2024-09-20 DIAGNOSIS — Z98.891 HISTORY OF UTERINE SCAR FROM PREVIOUS SURGERY: Chronic | ICD-10-CM

## 2024-09-20 PROCEDURE — 76830 TRANSVAGINAL US NON-OB: CPT

## 2024-09-20 PROCEDURE — 76856 US EXAM PELVIC COMPLETE: CPT | Mod: 26

## 2024-09-20 PROCEDURE — 76830 TRANSVAGINAL US NON-OB: CPT | Mod: 26

## 2024-09-20 PROCEDURE — 76856 US EXAM PELVIC COMPLETE: CPT

## 2024-10-08 ENCOUNTER — RX RENEWAL (OUTPATIENT)
Age: 74
End: 2024-10-08

## 2024-10-11 ENCOUNTER — APPOINTMENT (OUTPATIENT)
Dept: OBGYN | Facility: CLINIC | Age: 74
End: 2024-10-11
Payer: MEDICARE

## 2024-10-11 ENCOUNTER — LABORATORY RESULT (OUTPATIENT)
Age: 74
End: 2024-10-11

## 2024-10-11 VITALS
WEIGHT: 113 LBS | HEIGHT: 59 IN | SYSTOLIC BLOOD PRESSURE: 130 MMHG | DIASTOLIC BLOOD PRESSURE: 80 MMHG | BODY MASS INDEX: 22.78 KG/M2

## 2024-10-11 DIAGNOSIS — R93.89 ABNORMAL FINDINGS ON DIAGNOSTIC IMAGING OF OTHER SPECIFIED BODY STRUCTURES: ICD-10-CM

## 2024-10-11 PROCEDURE — 58100 BIOPSY OF UTERUS LINING: CPT

## 2024-10-22 ENCOUNTER — RX RENEWAL (OUTPATIENT)
Age: 74
End: 2024-10-22

## 2024-11-05 ENCOUNTER — EMERGENCY (EMERGENCY)
Facility: HOSPITAL | Age: 74
LOS: 1 days | Discharge: HOME | End: 2024-11-05
Attending: EMERGENCY MEDICINE | Admitting: EMERGENCY MEDICINE
Payer: COMMERCIAL

## 2024-11-05 VITALS
DIASTOLIC BLOOD PRESSURE: 88 MMHG | OXYGEN SATURATION: 97 % | HEIGHT: 59 IN | RESPIRATION RATE: 14 BRPM | WEIGHT: 111.99 LBS | HEART RATE: 70 BPM | TEMPERATURE: 98 F | SYSTOLIC BLOOD PRESSURE: 162 MMHG

## 2024-11-05 VITALS
RESPIRATION RATE: 18 BRPM | OXYGEN SATURATION: 97 % | TEMPERATURE: 98 F | HEART RATE: 62 BPM | DIASTOLIC BLOOD PRESSURE: 73 MMHG | SYSTOLIC BLOOD PRESSURE: 147 MMHG

## 2024-11-05 DIAGNOSIS — Z98.891 HISTORY OF UTERINE SCAR FROM PREVIOUS SURGERY: Chronic | ICD-10-CM

## 2024-11-05 DIAGNOSIS — Z98.49 CATARACT EXTRACTION STATUS, UNSPECIFIED EYE: Chronic | ICD-10-CM

## 2024-11-05 DIAGNOSIS — Z98.890 OTHER SPECIFIED POSTPROCEDURAL STATES: Chronic | ICD-10-CM

## 2024-11-05 PROCEDURE — 72128 CT CHEST SPINE W/O DYE: CPT | Mod: MC

## 2024-11-05 PROCEDURE — 72128 CT CHEST SPINE W/O DYE: CPT | Mod: 26,MC

## 2024-11-05 PROCEDURE — 70450 CT HEAD/BRAIN W/O DYE: CPT | Mod: 26,MC

## 2024-11-05 PROCEDURE — 99284 EMERGENCY DEPT VISIT MOD MDM: CPT | Mod: 25

## 2024-11-05 PROCEDURE — 72125 CT NECK SPINE W/O DYE: CPT | Mod: 26,MC

## 2024-11-05 PROCEDURE — 70450 CT HEAD/BRAIN W/O DYE: CPT | Mod: MC

## 2024-11-05 PROCEDURE — 72125 CT NECK SPINE W/O DYE: CPT | Mod: MC

## 2024-11-05 PROCEDURE — 99284 EMERGENCY DEPT VISIT MOD MDM: CPT

## 2024-11-05 RX ORDER — LIDOCAINE HYDROCHLORIDE 40 MG/ML
1 SOLUTION TOPICAL ONCE
Refills: 0 | Status: COMPLETED | OUTPATIENT
Start: 2024-11-05 | End: 2024-11-05

## 2024-11-05 RX ORDER — ACETAMINOPHEN 500 MG
975 TABLET ORAL ONCE
Refills: 0 | Status: COMPLETED | OUTPATIENT
Start: 2024-11-05 | End: 2024-11-05

## 2024-11-05 RX ADMIN — Medication 975 MILLIGRAM(S): at 12:42

## 2024-11-05 RX ADMIN — Medication 975 MILLIGRAM(S): at 12:12

## 2024-11-05 RX ADMIN — LIDOCAINE HYDROCHLORIDE 1 PATCH: 40 SOLUTION TOPICAL at 12:13

## 2024-11-05 NOTE — ED ADULT TRIAGE NOTE - NS ED NURSE AMBULANCES
Outpatient Physical Therapy Discharge Note     Patient: Carito Mills  : 1991    Beginning/End Dates of Reporting Period:  3/5/2020 to 2021    Referring Provider: CARLOS Ashford Diagnosis: impaired joint and soft tissue mobility, core and hip weakness     Client Self Report: Carito reports her back has been feeling pretty good actually. There is a new pain in the mid back since about a week ago, thinks it's from sleeping. Didn't get too sore from last visit.     Objective Measurements:  Objective Measure: Subjective pain rating  Details: 2/10 L low back   Objective Measure: segmental mobility  Details: L1 FRS R; T8 FRS R            Outcome Measures (most recent score):  Not tested. Unplanned discharge       Goals:  Goal Identifier mobility   Goal Description Pt will have improved lumbopelvic mobility to allow sitting for work tasks at least an hour with less than 4/10 pain.   Target Date 20   Date Met      Progress:     Goal Identifier strength   Goal Description Pt will have improved hip strength to 4+/5 to allow standing with appropriate posture at least 15 minutes with minimal increase in pain.   Target Date 20   Date Met      Progress:     Goal Identifier HEP   Goal Description Pt will be independent and consistent with performance of home exercises.   Target Date 20   Date Met      Progress:       Progress Toward Goals:   Not assessed this period.          Plan:  Discharge from therapy.    Discharge:    Reason for Discharge: Patient has not made expected progress due to interrupted treatment attendance.  Patient has failed to schedule further appointments.    Equipment Issued: n/a    Discharge Plan: Patient to continue home program.   [facility]
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
## 2024-11-05 NOTE — ED ADULT NURSE NOTE - PLAN OF CARE DISCUSSED WITH:
History obtained from pt's wife - Henry   *Pt will bring list of meds with him tomorrow prior to procedure*    61 y/o male with PMHx of hyperlipidemia, COPD, congenital heart disease with questionable PDA repair as a child, cardiomegaly, and dilated cardiomyopathy, with recent hospitalization on 2/18/18 at [facility] for systolic CHF presented to his cardiologist c/o progressively worsening ACOSTA and chest tightness over the last 3-4 months. Pt's wife states pt experienced ACOSTA after walking 1-2 blocks and up 1-2 flights of stairs. Pt's wife notes symptomatic improvement of ACOSTA and sensation of chest tightness since starting Lasix after most recent hospitalization. Pt denies orthopnea/PND, palpitations, LE edema, CP or SOB at rest.  Pt's most recent ECHO on 2/18/18 revealed left ventricular dilatation with an end diastolic dimension of 6.0 cm and a left ventricle EF of 30-35%.    In light of pt's risk factors, CCS class III anginal symptoms, and abnormal ECHO, pt is scheduled for a Right and Left Heart Cath with possible intervention. History obtained from pt's wife - Henry   *Pt will bring list of meds with him tomorrow prior to procedure*    61 y/o male with PMHx of hyperlipidemia, COPD, congenital heart disease with questionable PDA repair as a child, cardiomegaly, and dilated cardiomyopathy, with recent hospitalization on 2/18/18 at [facility] for systolic CHF presented to his cardiologist c/o progressively worsening ACOSTA and chest tightness over the last 3-4 months. Pt's wife states pt experienced ACOSTA after walking 1-2 blocks and up 1-2 flights of stairs. Pt's wife notes symptomatic improvement of ACOSTA and sensation of chest tightness since starting Lasix after most recent hospitalization. Pt denies orthopnea/PND, palpitations, LE edema, CP or SOB at rest.  Pt's most recent ECHO on 2/18/18 revealed left ventricular dilatation with an end diastolic dimension of 6.0 cm and a left ventricle EF of 30-35%.    In light of pt's risk factors, CCS class III anginal symptoms, and abnormal ECHO, pt recommended for a Right and Left Heart Cath with possible intervention. History obtained from pt's wife - Henry   *Pt will bring list of meds with him tomorrow prior to procedure*    59 y/o male with PMHx of hyperlipidemia, COPD, congenital heart disease with questionable PDA repair as a child, and cardiomegaly, with recent hospitalization on 2/18/18 at [facility] for new diagnosis of dilated cardiomyopathy (EF 30-35% on ECHO on 2/18/18) and acute decompensated systolic CHF presented to his cardiologist c/o progressively worsening ACOSTA and chest tightness over the last 3-4 months. Pt's wife states pt experienced ACOSTA after walking 1-2 blocks and up 1-2 flights of stairs prior to initiation of Lasix therapy after recent hospitalization, which has now improved with no dyspnea or chest tightness at rest. Pt denies orthopnea/PND, palpitations, LE edema, dizziness, or syncope.  Pt's most recent ECHO on 2/18/18 revealed left ventricular dilatation with an end diastolic dimension of 6.0 cm and a left ventricle EF of 30-35%. CTA Chest r/o PE on 2/14/18 revealed no evidence of PE, small right pleural effusion, and 24 mm groundglass nodule in RLL that could be infectious/inflammatory (pneumonia).    In light of pt's risk factors, CCS class III anginal symptoms, and abnormal ECHO, pt recommended for a Right and Left Heart Cath with possible intervention. 61 y/o male with PMHx of hyperlipidemia, HTN, COPD, congenital heart disease with questionable VSD repair at age of 3,  Moderate MR and TR on recent Echo 02/2018,  s/p recent hospitalization  at [facility] on 2/14/18 for new diagnosis of dilated cardiomyopathy (EF 30-35% on ECHO on 2/15/18) and Severe Pulmonary HTN,  who presented to his cardiologist or f/up c/o progressively worsening ACOSTA after walking 1-2 blocks and up 1-2 flights of stairs as well as chest tightness over the last 3-4 months. Pt endorses improvement of symptoms after initiation of Lasix (d/c'ed 2/21/18) therapy after recent hospitalization. Pt denies orthopnea/PND, palpitations, LE edema, dizziness, or syncope.   Cardiac studies performed during recent hospitalization @ [facility] included  ECHO on 2/18/18 which revealed severe left ventricular systolic dysfunction, left ventricular dilatation with an end diastolic dimension of 6.0 cm and a left ventricle EF of 30-35%, Moderate MR, Moderate TR, Severe Pulmonary HTN, PA Pressure 70 mmHg, Left Atrial Enlargement 4.8 cm, Right Ventricular Enlargement, and Right Atrial Enlargement. CTA Chest PE Protocol on 2/14/18 revealed no evidence of PE, small right pleural effusion, cardiomegaly and 24 mm groundglass nodule in RLL that could be infectious/inflammatory. EKG on 2/15/18 NSR with premature ventricular depolarizations, left atrial abnormality, RBBB, LAD, and nonspecific ST and T wave changes.    In light of pt's risk factors, CCS class III anginal symptoms, and abnormal ECHO findings, pt recommended for a Right and Left Heart Cath with possible intervention to evaluate coronaries and RH pressures. Patient
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
## 2024-11-05 NOTE — ED PROVIDER NOTE - CARE PROVIDER_API CALL
unk
Cornelius Sotelo  Orthopaedic Surgery  [address]  [place] [address]  Phone: [phone]  Fax: [phone]  Follow Up Time:

## 2024-11-05 NOTE — ED ADULT NURSE REASSESSMENT NOTE - NS ED NURSE REASSESS COMMENT FT1
patient A&Ox3 in no acute distress , discharge as orders no heplock left ER self ambulated with daughter in law by her side
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
## 2024-11-05 NOTE — ED PROVIDER NOTE - PATIENT PORTAL LINK FT
You can access the FollowMyHealth Patient Portal offered by [facility] by registering at the following website: [email or web address]. By joining [employer]’s FollowMyHealth portal, you will also be able to view your health information using other applications (apps) compatible with our system.
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
## 2024-11-05 NOTE — ED ADULT NURSE NOTE - NSFALLUNIVINTERV_ED_ALL_ED
Bed/Stretcher in lowest position, wheels locked, appropriate side rails in place/Call bell, personal items and telephone in reach/Instruct patient to call for assistance before getting out of bed/chair/stretcher/Non-slip footwear applied when patient is off stretcher/[place] to call system/Physically safe environment - no spills, clutter or unnecessary equipment/Purposeful proactive rounding/Room/bathroom lighting operational, light cord in reach

## 2024-11-07 ENCOUNTER — APPOINTMENT (OUTPATIENT)
Dept: UROGYNECOLOGY | Facility: CLINIC | Age: 74
End: 2024-11-07

## 2024-11-07 VITALS
DIASTOLIC BLOOD PRESSURE: 68 MMHG | HEIGHT: 59 IN | HEART RATE: 64 BPM | BODY MASS INDEX: 22.58 KG/M2 | WEIGHT: 112 LBS | SYSTOLIC BLOOD PRESSURE: 140 MMHG | OXYGEN SATURATION: 98 %

## 2024-11-07 LAB
BILIRUB UR QL STRIP: NEGATIVE
CLARITY UR: CLEAR
COLLECTION METHOD: NORMAL
GLUCOSE UR-MCNC: 500
HCG UR QL: 0.2 EU/DL
HGB UR QL STRIP.AUTO: NEGATIVE
KETONES UR-MCNC: NEGATIVE
LEUKOCYTE ESTERASE UR QL STRIP: NEGATIVE
NITRITE UR QL STRIP: NEGATIVE
PH UR STRIP: 7
PROT UR STRIP-MCNC: NEGATIVE
SP GR UR STRIP: 1.01

## 2024-11-07 PROCEDURE — 99459 PELVIC EXAMINATION: CPT

## 2024-11-07 PROCEDURE — 99204 OFFICE O/P NEW MOD 45 MIN: CPT | Mod: 25

## 2024-11-07 PROCEDURE — 51701 INSERT BLADDER CATHETER: CPT | Mod: 59

## 2024-11-07 PROCEDURE — 81003 URINALYSIS AUTO W/O SCOPE: CPT | Mod: QW

## 2024-11-07 PROCEDURE — 99214 OFFICE O/P EST MOD 30 MIN: CPT | Mod: 25

## 2024-11-27 NOTE — H&P CARDIOLOGY - TIME:
Current patient location: [address] [facility] [address]    Is the patient currently in the state of [place]? YES    Visit mode:VIDEO    If the visit is dropped, the patient can be reconnected by:VIDEO VISIT: Text to cell phone:   Telephone Information:   Mobile [phone]       Will anyone else be joining the visit? NO  (If patient encounters technical issues they should call [phone]    Are changes needed to the allergy or medication list? No    Are refills needed on medications prescribed by this physician? NO    Rooming Documentation:  Questionnaire(s) not done per department protocol    Reason for visit: Consult    Shelby Kocher VVF      
Stroke prophylaxis - - Aspirin and Plavix x 21 days from March 30, 2023, then Aspirin 81mg indefinitely  - Increase Crestor to 20mg  
11:09

## 2024-12-24 PROBLEM — F10.90 ALCOHOL USE: Status: INACTIVE | Noted: 2017-09-26

## 2025-01-10 ENCOUNTER — APPOINTMENT (OUTPATIENT)
Dept: CARDIOLOGY | Facility: CLINIC | Age: 75
End: 2025-01-10
Payer: MEDICARE

## 2025-01-10 ENCOUNTER — NON-APPOINTMENT (OUTPATIENT)
Age: 75
End: 2025-01-10

## 2025-01-10 VITALS — HEART RATE: 75 BPM | BODY MASS INDEX: 23.18 KG/M2 | OXYGEN SATURATION: 98 % | HEIGHT: 59 IN | WEIGHT: 115 LBS

## 2025-01-10 DIAGNOSIS — I25.10 ATHEROSCLEROTIC HEART DISEASE OF NATIVE CORONARY ARTERY W/OUT ANGINA PECTORIS: ICD-10-CM

## 2025-01-10 DIAGNOSIS — E78.5 HYPERLIPIDEMIA, UNSPECIFIED: ICD-10-CM

## 2025-01-10 DIAGNOSIS — I10 ESSENTIAL (PRIMARY) HYPERTENSION: ICD-10-CM

## 2025-01-10 PROCEDURE — G2211 COMPLEX E/M VISIT ADD ON: CPT

## 2025-01-10 PROCEDURE — 93000 ELECTROCARDIOGRAM COMPLETE: CPT

## 2025-01-10 PROCEDURE — 99214 OFFICE O/P EST MOD 30 MIN: CPT

## 2025-01-13 ENCOUNTER — RX RENEWAL (OUTPATIENT)
Age: 75
End: 2025-01-13

## 2025-02-20 ENCOUNTER — RX RENEWAL (OUTPATIENT)
Age: 75
End: 2025-02-20

## 2025-04-14 ENCOUNTER — RX RENEWAL (OUTPATIENT)
Age: 75
End: 2025-04-14

## 2025-04-17 DIAGNOSIS — N64.89 OTHER SPECIFIED DISORDERS OF BREAST: ICD-10-CM

## 2025-04-22 ENCOUNTER — NON-APPOINTMENT (OUTPATIENT)
Age: 75
End: 2025-04-22

## 2025-05-14 ENCOUNTER — NON-APPOINTMENT (OUTPATIENT)
Age: 75
End: 2025-05-14

## 2025-05-16 ENCOUNTER — APPOINTMENT (OUTPATIENT)
Dept: CARDIOLOGY | Facility: CLINIC | Age: 75
End: 2025-05-16
Payer: MEDICARE

## 2025-05-16 PROCEDURE — 93015 CV STRESS TEST SUPVJ I&R: CPT

## 2025-05-16 PROCEDURE — A9500: CPT

## 2025-05-16 PROCEDURE — 78452 HT MUSCLE IMAGE SPECT MULT: CPT

## 2025-07-02 ENCOUNTER — RX RENEWAL (OUTPATIENT)
Age: 75
End: 2025-07-02

## 2025-07-14 ENCOUNTER — APPOINTMENT (OUTPATIENT)
Dept: CARDIOLOGY | Facility: CLINIC | Age: 75
End: 2025-07-14
Payer: MEDICARE

## 2025-07-14 VITALS
DIASTOLIC BLOOD PRESSURE: 66 MMHG | SYSTOLIC BLOOD PRESSURE: 123 MMHG | BODY MASS INDEX: 23.39 KG/M2 | OXYGEN SATURATION: 95 % | HEIGHT: 59 IN | HEART RATE: 75 BPM | WEIGHT: 116 LBS

## 2025-07-14 PROCEDURE — G2211 COMPLEX E/M VISIT ADD ON: CPT

## 2025-07-14 PROCEDURE — 93000 ELECTROCARDIOGRAM COMPLETE: CPT

## 2025-07-14 PROCEDURE — 99214 OFFICE O/P EST MOD 30 MIN: CPT

## 2025-08-21 RX ORDER — NEBIVOLOL 20 MG/1
20 TABLET ORAL
Qty: 180 | Refills: 1 | Status: ACTIVE | COMMUNITY
Start: 2025-08-15

## 2025-08-22 ENCOUNTER — APPOINTMENT (OUTPATIENT)
Dept: CARDIOLOGY | Facility: CLINIC | Age: 75
End: 2025-08-22
Payer: MEDICARE

## 2025-08-22 ENCOUNTER — APPOINTMENT (OUTPATIENT)
Dept: CARDIOLOGY | Facility: CLINIC | Age: 75
End: 2025-08-22

## 2025-08-22 PROCEDURE — 93925 LOWER EXTREMITY STUDY: CPT

## 2025-09-08 ENCOUNTER — RX RENEWAL (OUTPATIENT)
Age: 75
End: 2025-09-08